# Patient Record
Sex: FEMALE | Race: WHITE | Employment: OTHER | URBAN - METROPOLITAN AREA
[De-identification: names, ages, dates, MRNs, and addresses within clinical notes are randomized per-mention and may not be internally consistent; named-entity substitution may affect disease eponyms.]

---

## 2017-07-03 ENCOUNTER — OFFICE VISIT (OUTPATIENT)
Dept: FAMILY MEDICINE CLINIC | Age: 62
End: 2017-07-03
Payer: COMMERCIAL

## 2017-07-03 ENCOUNTER — TELEPHONE (OUTPATIENT)
Dept: FAMILY MEDICINE CLINIC | Age: 62
End: 2017-07-03

## 2017-07-03 ENCOUNTER — HOSPITAL ENCOUNTER (OUTPATIENT)
Age: 62
Setting detail: SPECIMEN
Discharge: HOME | End: 2017-07-03
Payer: COMMERCIAL

## 2017-07-03 VITALS
DIASTOLIC BLOOD PRESSURE: 69 MMHG | SYSTOLIC BLOOD PRESSURE: 110 MMHG | WEIGHT: 183.8 LBS | TEMPERATURE: 97.8 F | BODY MASS INDEX: 36.08 KG/M2 | HEART RATE: 83 BPM | HEIGHT: 60 IN

## 2017-07-03 DIAGNOSIS — I10 ESSENTIAL HYPERTENSION: ICD-10-CM

## 2017-07-03 DIAGNOSIS — N95.1 MENOPAUSAL AND FEMALE CLIMACTERIC STATES: ICD-10-CM

## 2017-07-03 DIAGNOSIS — E78.00 PURE HYPERCHOLESTEROLEMIA: ICD-10-CM

## 2017-07-03 DIAGNOSIS — E78.00 PURE HYPERCHOLESTEROLEMIA: Primary | ICD-10-CM

## 2017-07-03 LAB
ALBUMIN SERPL-MCNC: 4.1 G/DL (ref 3.5–5.2)
ALBUMIN/GLOBULIN RATIO: 1.2 (ref 1–2.5)
ALP BLD-CCNC: 62 U/L (ref 35–104)
ALT SERPL-CCNC: 23 U/L (ref 5–33)
ANION GAP SERPL CALCULATED.3IONS-SCNC: 14 MMOL/L (ref 9–17)
AST SERPL-CCNC: 24 U/L
BILIRUB SERPL-MCNC: 0.39 MG/DL (ref 0.3–1.2)
BILIRUBIN DIRECT: 0.09 MG/DL
BILIRUBIN, INDIRECT: 0.3 MG/DL (ref 0–1)
BUN BLDV-MCNC: 12 MG/DL (ref 8–23)
BUN/CREAT BLD: NORMAL (ref 9–20)
CALCIUM SERPL-MCNC: 9.2 MG/DL (ref 8.6–10.4)
CHLORIDE BLD-SCNC: 99 MMOL/L (ref 98–107)
CHOLESTEROL/HDL RATIO: 4.8
CHOLESTEROL: 252 MG/DL
CO2: 27 MMOL/L (ref 20–31)
CREAT SERPL-MCNC: 0.63 MG/DL (ref 0.5–0.9)
GFR AFRICAN AMERICAN: >60 ML/MIN
GFR NON-AFRICAN AMERICAN: >60 ML/MIN
GFR SERPL CREATININE-BSD FRML MDRD: NORMAL ML/MIN/{1.73_M2}
GFR SERPL CREATININE-BSD FRML MDRD: NORMAL ML/MIN/{1.73_M2}
GLOBULIN: NORMAL G/DL (ref 1.5–3.8)
GLUCOSE BLD-MCNC: 92 MG/DL (ref 70–99)
HDLC SERPL-MCNC: 53 MG/DL
LDL CHOLESTEROL: 159 MG/DL (ref 0–130)
POTASSIUM SERPL-SCNC: 3.9 MMOL/L (ref 3.7–5.3)
SODIUM BLD-SCNC: 140 MMOL/L (ref 135–144)
TOTAL PROTEIN: 7.4 G/DL (ref 6.4–8.3)
TRIGL SERPL-MCNC: 198 MG/DL
VLDLC SERPL CALC-MCNC: ABNORMAL MG/DL (ref 1–30)

## 2017-07-03 PROCEDURE — 99213 OFFICE O/P EST LOW 20 MIN: CPT | Performed by: FAMILY MEDICINE

## 2017-07-03 RX ORDER — SIMVASTATIN 40 MG
40 TABLET ORAL NIGHTLY
Qty: 90 TABLET | Refills: 3 | Status: SHIPPED | OUTPATIENT
Start: 2017-07-03 | End: 2018-10-08 | Stop reason: SINTOL

## 2017-07-03 RX ORDER — CITALOPRAM 20 MG/1
20 TABLET ORAL DAILY
Qty: 90 TABLET | Refills: 3 | Status: SHIPPED | OUTPATIENT
Start: 2017-07-03 | End: 2018-01-16 | Stop reason: SDUPTHER

## 2017-07-03 RX ORDER — ESTRADIOL 0.1 MG/D
1 FILM, EXTENDED RELEASE TRANSDERMAL
Qty: 8 PATCH | Refills: 11 | Status: SHIPPED | OUTPATIENT
Start: 2017-07-03 | End: 2017-08-23 | Stop reason: SDUPTHER

## 2017-07-03 RX ORDER — SIMVASTATIN 40 MG
40 TABLET ORAL NIGHTLY
Qty: 90 TABLET | Refills: 3 | Status: SHIPPED | OUTPATIENT
Start: 2017-07-03 | End: 2017-07-03 | Stop reason: SDUPTHER

## 2017-07-03 RX ORDER — SPIRONOLACTONE AND HYDROCHLOROTHIAZIDE 25; 25 MG/1; MG/1
1 TABLET ORAL DAILY
Qty: 90 TABLET | Refills: 3 | Status: SHIPPED | OUTPATIENT
Start: 2017-07-03 | End: 2018-01-16 | Stop reason: SDUPTHER

## 2017-07-03 ASSESSMENT — ENCOUNTER SYMPTOMS
ABDOMINAL PAIN: 0
COUGH: 0
SHORTNESS OF BREATH: 0
SORE THROAT: 0
TROUBLE SWALLOWING: 0
CONSTIPATION: 0
NAUSEA: 0

## 2017-08-25 RX ORDER — ESTRADIOL 0.1 MG/D
1 FILM, EXTENDED RELEASE TRANSDERMAL
Qty: 8 PATCH | Refills: 11 | Status: SHIPPED | OUTPATIENT
Start: 2017-08-28 | End: 2017-08-30 | Stop reason: SDUPTHER

## 2017-08-30 DIAGNOSIS — N95.1 MENOPAUSAL HOT FLUSHES: ICD-10-CM

## 2017-08-30 DIAGNOSIS — K21.9 GASTROESOPHAGEAL REFLUX DISEASE WITHOUT ESOPHAGITIS: Primary | ICD-10-CM

## 2017-08-30 RX ORDER — ESTRADIOL 0.1 MG/D
1 FILM, EXTENDED RELEASE TRANSDERMAL
Qty: 8 PATCH | Refills: 11 | Status: SHIPPED | OUTPATIENT
Start: 2017-08-31 | End: 2018-10-08 | Stop reason: SDUPTHER

## 2018-01-16 DIAGNOSIS — F41.9 ANXIETY: Primary | ICD-10-CM

## 2018-01-16 DIAGNOSIS — I10 ESSENTIAL HYPERTENSION: ICD-10-CM

## 2018-01-16 RX ORDER — SPIRONOLACTONE AND HYDROCHLOROTHIAZIDE 25; 25 MG/1; MG/1
1 TABLET ORAL DAILY
Qty: 90 TABLET | Refills: 1 | Status: SHIPPED | OUTPATIENT
Start: 2018-01-16 | End: 2018-10-08 | Stop reason: SDUPTHER

## 2018-01-16 RX ORDER — CITALOPRAM 20 MG/1
20 TABLET ORAL DAILY
Qty: 90 TABLET | Refills: 1 | Status: SHIPPED | OUTPATIENT
Start: 2018-01-16 | End: 2018-10-08 | Stop reason: SDUPTHER

## 2018-05-09 DIAGNOSIS — K21.9 GASTROESOPHAGEAL REFLUX DISEASE WITHOUT ESOPHAGITIS: ICD-10-CM

## 2018-06-26 ENCOUNTER — HOSPITAL ENCOUNTER (EMERGENCY)
Age: 63
Discharge: HOME | End: 2018-06-26
Attending: EMERGENCY MEDICINE
Payer: COMMERCIAL

## 2018-06-26 ENCOUNTER — APPOINTMENT (OUTPATIENT)
Dept: CT IMAGING | Age: 63
End: 2018-06-26
Payer: COMMERCIAL

## 2018-06-26 VITALS
SYSTOLIC BLOOD PRESSURE: 146 MMHG | DIASTOLIC BLOOD PRESSURE: 92 MMHG | WEIGHT: 178 LBS | RESPIRATION RATE: 18 BRPM | TEMPERATURE: 98 F | HEART RATE: 94 BPM | OXYGEN SATURATION: 97 % | HEIGHT: 61 IN | BODY MASS INDEX: 33.61 KG/M2

## 2018-06-26 DIAGNOSIS — K57.32 DIVERTICULITIS OF COLON: Primary | ICD-10-CM

## 2018-06-26 LAB
-: ABNORMAL
ABSOLUTE EOS #: 0.1 K/UL (ref 0–0.4)
ABSOLUTE IMMATURE GRANULOCYTE: ABNORMAL K/UL (ref 0–0.3)
ABSOLUTE LYMPH #: 1.4 K/UL (ref 1–4.8)
ABSOLUTE MONO #: 0.9 K/UL (ref 0.1–1.3)
ALBUMIN SERPL-MCNC: 3.8 G/DL (ref 3.5–5.2)
ALBUMIN/GLOBULIN RATIO: ABNORMAL (ref 1–2.5)
ALP BLD-CCNC: 74 U/L (ref 35–104)
ALT SERPL-CCNC: 17 U/L (ref 5–33)
AMORPHOUS: ABNORMAL
ANION GAP SERPL CALCULATED.3IONS-SCNC: 11 MMOL/L (ref 9–17)
AST SERPL-CCNC: 24 U/L
BACTERIA: ABNORMAL
BASOPHILS # BLD: 1 % (ref 0–2)
BASOPHILS ABSOLUTE: 0.1 K/UL (ref 0–0.2)
BILIRUB SERPL-MCNC: 0.43 MG/DL (ref 0.3–1.2)
BILIRUBIN URINE: NEGATIVE
BUN BLDV-MCNC: 11 MG/DL (ref 8–23)
BUN/CREAT BLD: ABNORMAL (ref 9–20)
CALCIUM SERPL-MCNC: 9.2 MG/DL (ref 8.6–10.4)
CASTS UA: ABNORMAL /LPF
CHLORIDE BLD-SCNC: 97 MMOL/L (ref 98–107)
CO2: 30 MMOL/L (ref 20–31)
COLOR: YELLOW
COMMENT UA: ABNORMAL
CREAT SERPL-MCNC: 0.67 MG/DL (ref 0.5–0.9)
CRYSTALS, UA: ABNORMAL /HPF
DIFFERENTIAL TYPE: ABNORMAL
EOSINOPHILS RELATIVE PERCENT: 1 % (ref 0–4)
EPITHELIAL CELLS UA: ABNORMAL /HPF
GFR AFRICAN AMERICAN: >60 ML/MIN
GFR NON-AFRICAN AMERICAN: >60 ML/MIN
GFR SERPL CREATININE-BSD FRML MDRD: ABNORMAL ML/MIN/{1.73_M2}
GFR SERPL CREATININE-BSD FRML MDRD: ABNORMAL ML/MIN/{1.73_M2}
GLUCOSE BLD-MCNC: 126 MG/DL (ref 70–99)
GLUCOSE URINE: NEGATIVE
HCT VFR BLD CALC: 42.3 % (ref 36–46)
HEMOGLOBIN: 14.4 G/DL (ref 12–16)
IMMATURE GRANULOCYTES: ABNORMAL %
KETONES, URINE: NEGATIVE
LEUKOCYTE ESTERASE, URINE: NEGATIVE
LYMPHOCYTES # BLD: 11 % (ref 24–44)
MCH RBC QN AUTO: 29.4 PG (ref 26–34)
MCHC RBC AUTO-ENTMCNC: 34.1 G/DL (ref 31–37)
MCV RBC AUTO: 86.3 FL (ref 80–100)
MONOCYTES # BLD: 8 % (ref 1–7)
MUCUS: ABNORMAL
NITRITE, URINE: NEGATIVE
NRBC AUTOMATED: ABNORMAL PER 100 WBC
OTHER OBSERVATIONS UA: ABNORMAL
PDW BLD-RTO: 13 % (ref 11.5–14.9)
PH UA: 5.5 (ref 5–8)
PLATELET # BLD: 274 K/UL (ref 150–450)
PLATELET ESTIMATE: ABNORMAL
PMV BLD AUTO: 9.2 FL (ref 6–12)
POTASSIUM SERPL-SCNC: 3.4 MMOL/L (ref 3.7–5.3)
PROTEIN UA: NEGATIVE
RBC # BLD: 4.9 M/UL (ref 4–5.2)
RBC # BLD: ABNORMAL 10*6/UL
RBC UA: ABNORMAL /HPF
RENAL EPITHELIAL, UA: ABNORMAL /HPF
SEG NEUTROPHILS: 79 % (ref 36–66)
SEGMENTED NEUTROPHILS ABSOLUTE COUNT: 9.8 K/UL (ref 1.3–9.1)
SODIUM BLD-SCNC: 138 MMOL/L (ref 135–144)
SPECIFIC GRAVITY UA: 1.02 (ref 1–1.03)
TOTAL PROTEIN: 7.4 G/DL (ref 6.4–8.3)
TRICHOMONAS: ABNORMAL
TURBIDITY: ABNORMAL
URINE HGB: NEGATIVE
UROBILINOGEN, URINE: NORMAL
WBC # BLD: 12.2 K/UL (ref 3.5–11)
WBC # BLD: ABNORMAL 10*3/UL
WBC UA: ABNORMAL /HPF
YEAST: ABNORMAL

## 2018-06-26 PROCEDURE — 74176 CT ABD & PELVIS W/O CONTRAST: CPT

## 2018-06-26 PROCEDURE — 99284 EMERGENCY DEPT VISIT MOD MDM: CPT

## 2018-06-26 PROCEDURE — 81001 URINALYSIS AUTO W/SCOPE: CPT

## 2018-06-26 PROCEDURE — 6370000000 HC RX 637 (ALT 250 FOR IP): Performed by: EMERGENCY MEDICINE

## 2018-06-26 PROCEDURE — 85025 COMPLETE CBC W/AUTO DIFF WBC: CPT

## 2018-06-26 PROCEDURE — 80053 COMPREHEN METABOLIC PANEL: CPT

## 2018-06-26 PROCEDURE — 36415 COLL VENOUS BLD VENIPUNCTURE: CPT

## 2018-06-26 RX ORDER — METRONIDAZOLE 500 MG/1
500 TABLET ORAL 3 TIMES DAILY
Qty: 21 TABLET | Refills: 0 | Status: SHIPPED | OUTPATIENT
Start: 2018-06-26 | End: 2018-07-03

## 2018-06-26 RX ORDER — CIPROFLOXACIN 500 MG/1
500 TABLET, FILM COATED ORAL ONCE
Status: COMPLETED | OUTPATIENT
Start: 2018-06-26 | End: 2018-06-26

## 2018-06-26 RX ORDER — M-VIT,TX,IRON,MINS/CALC/FOLIC 27MG-0.4MG
1 TABLET ORAL DAILY
COMMUNITY
End: 2018-10-08 | Stop reason: ALTCHOICE

## 2018-06-26 RX ORDER — METRONIDAZOLE 500 MG/1
500 TABLET ORAL ONCE
Status: COMPLETED | OUTPATIENT
Start: 2018-06-26 | End: 2018-06-26

## 2018-06-26 RX ORDER — CIPROFLOXACIN 500 MG/1
500 TABLET, FILM COATED ORAL 2 TIMES DAILY
Qty: 14 TABLET | Refills: 0 | Status: SHIPPED | OUTPATIENT
Start: 2018-06-26 | End: 2018-07-03

## 2018-06-26 RX ADMIN — CIPROFLOXACIN 500 MG: 500 TABLET, FILM COATED ORAL at 09:03

## 2018-06-26 RX ADMIN — METRONIDAZOLE 500 MG: 500 TABLET ORAL at 09:03

## 2018-06-26 ASSESSMENT — ENCOUNTER SYMPTOMS
EYE REDNESS: 0
BACK PAIN: 0
COUGH: 0
NAUSEA: 0
RHINORRHEA: 0
ABDOMINAL PAIN: 1
EYE PAIN: 0
BLOOD IN STOOL: 1
VOMITING: 0
SHORTNESS OF BREATH: 0

## 2018-10-08 ENCOUNTER — OFFICE VISIT (OUTPATIENT)
Dept: FAMILY MEDICINE CLINIC | Age: 63
End: 2018-10-08
Payer: COMMERCIAL

## 2018-10-08 ENCOUNTER — TELEPHONE (OUTPATIENT)
Dept: FAMILY MEDICINE CLINIC | Age: 63
End: 2018-10-08

## 2018-10-08 VITALS
BODY MASS INDEX: 34.63 KG/M2 | TEMPERATURE: 97 F | HEART RATE: 80 BPM | DIASTOLIC BLOOD PRESSURE: 79 MMHG | WEIGHT: 183.4 LBS | HEIGHT: 61 IN | SYSTOLIC BLOOD PRESSURE: 127 MMHG

## 2018-10-08 DIAGNOSIS — J30.89 ALLERGIC RHINITIS DUE TO OTHER ALLERGIC TRIGGER, UNSPECIFIED SEASONALITY: ICD-10-CM

## 2018-10-08 DIAGNOSIS — F41.9 ANXIETY: ICD-10-CM

## 2018-10-08 DIAGNOSIS — Z12.39 BREAST SCREENING: ICD-10-CM

## 2018-10-08 DIAGNOSIS — K21.9 GASTROESOPHAGEAL REFLUX DISEASE WITHOUT ESOPHAGITIS: ICD-10-CM

## 2018-10-08 DIAGNOSIS — K57.30 DIVERTICULOSIS OF LARGE INTESTINE WITHOUT HEMORRHAGE: ICD-10-CM

## 2018-10-08 DIAGNOSIS — N95.1 MENOPAUSAL HOT FLUSHES: ICD-10-CM

## 2018-10-08 DIAGNOSIS — I10 ESSENTIAL HYPERTENSION: Primary | ICD-10-CM

## 2018-10-08 DIAGNOSIS — R42 VERTIGO: ICD-10-CM

## 2018-10-08 PROCEDURE — G0008 ADMIN INFLUENZA VIRUS VAC: HCPCS | Performed by: FAMILY MEDICINE

## 2018-10-08 PROCEDURE — 99213 OFFICE O/P EST LOW 20 MIN: CPT | Performed by: FAMILY MEDICINE

## 2018-10-08 RX ORDER — SCOLOPAMINE TRANSDERMAL SYSTEM 1 MG/1
1 PATCH, EXTENDED RELEASE TRANSDERMAL
Qty: 4 PATCH | Refills: 2 | Status: SHIPPED | OUTPATIENT
Start: 2018-10-08 | End: 2019-05-14 | Stop reason: SDUPTHER

## 2018-10-08 RX ORDER — METRONIDAZOLE 250 MG/1
250 TABLET ORAL 3 TIMES DAILY
Qty: 30 TABLET | Refills: 1 | Status: SHIPPED | OUTPATIENT
Start: 2018-10-08 | End: 2019-12-13 | Stop reason: SDUPTHER

## 2018-10-08 RX ORDER — ESTRADIOL 0.1 MG/D
1 FILM, EXTENDED RELEASE TRANSDERMAL
Qty: 8 PATCH | Refills: 11 | Status: SHIPPED | OUTPATIENT
Start: 2018-10-08 | End: 2019-05-14 | Stop reason: SDUPTHER

## 2018-10-08 RX ORDER — CITALOPRAM 20 MG/1
20 TABLET ORAL DAILY
Qty: 90 TABLET | Refills: 1 | Status: SHIPPED | OUTPATIENT
Start: 2018-10-08 | End: 2019-05-11 | Stop reason: SDUPTHER

## 2018-10-08 RX ORDER — SPIRONOLACTONE AND HYDROCHLOROTHIAZIDE 25; 25 MG/1; MG/1
1 TABLET ORAL DAILY
Qty: 90 TABLET | Refills: 3 | Status: SHIPPED | OUTPATIENT
Start: 2018-10-08 | End: 2019-05-14 | Stop reason: SDUPTHER

## 2018-10-08 RX ORDER — MOMETASONE FUROATE 50 UG/1
2 SPRAY, METERED NASAL DAILY PRN
Qty: 1 INHALER | Refills: 6 | Status: SHIPPED | OUTPATIENT
Start: 2018-10-08 | End: 2019-05-14 | Stop reason: SDUPTHER

## 2018-10-08 ASSESSMENT — ENCOUNTER SYMPTOMS
COUGH: 0
ABDOMINAL PAIN: 0
SHORTNESS OF BREATH: 0
CONSTIPATION: 0
BACK PAIN: 0
SORE THROAT: 0
PHOTOPHOBIA: 0

## 2018-10-08 ASSESSMENT — PATIENT HEALTH QUESTIONNAIRE - PHQ9
SUM OF ALL RESPONSES TO PHQ QUESTIONS 1-9: 0
SUM OF ALL RESPONSES TO PHQ QUESTIONS 1-9: 0
SUM OF ALL RESPONSES TO PHQ9 QUESTIONS 1 & 2: 0
2. FEELING DOWN, DEPRESSED OR HOPELESS: 0
1. LITTLE INTEREST OR PLEASURE IN DOING THINGS: 0

## 2018-10-08 NOTE — PROGRESS NOTES
without hemorrhage  metroNIDAZOLE (FLAGYL) 250 MG tablet   8. Breast screening  [facility] DIGITAL SCREEN SELF REFERRAL W OR WO CAD BILATERAL           Plan:      See orders.       Recheck office visit - 1 year           Thomas Pretty DO

## 2018-10-08 NOTE — PATIENT INSTRUCTIONS
2. [address] [place], [place] [address]  [phone]  Hours: Monday-Friday 8:00 am-8:00 pm and Saturday and Sunday 9:30 am-6:00 pm    3. [address]  [address] [place], [place] [address]  [phone]  Hours: Monday-Friday 9:00 am-5:30 pm and Saturday 10:00 am-4:00 pm    4. [address] [place] [address]  [phone]  Hours: Monday-Friday 9:00 am-5:30 pm and Saturday 10:00 am-2:00 pm

## 2018-10-09 PROCEDURE — G0008 ADMIN INFLUENZA VIRUS VAC: HCPCS | Performed by: FAMILY MEDICINE

## 2018-10-18 ENCOUNTER — TELEPHONE (OUTPATIENT)
Dept: FAMILY MEDICINE CLINIC | Age: 63
End: 2018-10-18

## 2018-10-18 DIAGNOSIS — Z80.0 FAMILY HISTORY OF COLON CANCER: Primary | ICD-10-CM

## 2018-10-18 DIAGNOSIS — Z80.0 FAMILY HISTORY OF COLON CANCER: ICD-10-CM

## 2018-10-18 LAB
CONTROL: PRESENT
HEMOCCULT STL QL: NEGATIVE

## 2019-01-18 ENCOUNTER — TELEPHONE (OUTPATIENT)
Dept: FAMILY MEDICINE CLINIC | Age: 64
End: 2019-01-18

## 2019-01-23 ENCOUNTER — HOSPITAL ENCOUNTER (OUTPATIENT)
Dept: WOMENS IMAGING | Age: 64
Discharge: HOME | End: 2019-01-25
Payer: COMMERCIAL

## 2019-01-23 DIAGNOSIS — Z12.39 BREAST SCREENING: ICD-10-CM

## 2019-01-23 PROCEDURE — 77063 BREAST TOMOSYNTHESIS BI: CPT

## 2019-05-11 DIAGNOSIS — F41.9 ANXIETY: ICD-10-CM

## 2019-05-13 RX ORDER — CITALOPRAM 20 MG/1
TABLET ORAL
Qty: 150 TABLET | Refills: 0 | Status: SHIPPED | OUTPATIENT
Start: 2019-05-13 | End: 2019-12-13 | Stop reason: SDUPTHER

## 2019-05-13 NOTE — TELEPHONE ENCOUNTER
Next Visit Date:  No future appointments.     Health Maintenance   Topic Date Due    Hepatitis C screen  1955    HIV screen  03/07/1969    DTaP/Tdap/Td vaccine (1 - Tdap) 03/07/1973    Diabetes screen  03/07/1994    Shingles Vaccine (1 of 2) 03/07/2004    Pneumococcal 65+ years Vaccine (1 of 2 - PCV13) 03/07/2019    Potassium monitoring  06/26/2019    Creatinine monitoring  06/26/2019    Colon Cancer Screen FIT/FOBT  10/17/2019    Breast cancer screen  01/23/2020    Cervical cancer screen  07/25/2021    Lipid screen  07/03/2022    DEXA (modify frequency per FRAX score)  Completed    Flu vaccine  Completed       No results found for: LABA1C          ( goal A1C is < 7)   No results found for: LABMICR  LDL Cholesterol (mg/dL)   Date Value   07/03/2017 159 (H)       (goal LDL is <100)   AST (U/L)   Date Value   06/26/2018 24     ALT (U/L)   Date Value   06/26/2018 17     BUN (mg/dL)   Date Value   06/26/2018 11     BP Readings from Last 3 Encounters:   10/08/18 127/79   06/26/18 (!) 146/92   07/03/17 110/69          (goal 120/80)    All Future Testing planned in [facility]  Lab Frequency Next Occurrence               Patient Active Problem List:     Hyperlipidemia     GERD (gastroesophageal reflux disease)     Allergic rhinitis     Family history of breast cancer     Family history of colon cancer

## 2019-05-14 DIAGNOSIS — N95.1 MENOPAUSAL HOT FLUSHES: ICD-10-CM

## 2019-05-14 DIAGNOSIS — R42 VERTIGO: ICD-10-CM

## 2019-05-14 DIAGNOSIS — K21.9 GASTROESOPHAGEAL REFLUX DISEASE WITHOUT ESOPHAGITIS: ICD-10-CM

## 2019-05-14 DIAGNOSIS — I10 ESSENTIAL HYPERTENSION: ICD-10-CM

## 2019-05-14 DIAGNOSIS — J30.89 ALLERGIC RHINITIS DUE TO OTHER ALLERGIC TRIGGER, UNSPECIFIED SEASONALITY: ICD-10-CM

## 2019-05-14 NOTE — TELEPHONE ENCOUNTER
Patient calling and has new insurance, needs refills on all meds sent to [facility] [employer] Last visit:   Last Med refill:   Does patient have enough medication for 72 hours: Yes    Next Visit Date:  No future appointments.     Health Maintenance   Topic Date Due    Hepatitis C screen  1955    HIV screen  03/07/1969    DTaP/Tdap/Td vaccine (1 - Tdap) 03/07/1973    Diabetes screen  03/07/1994    Shingles Vaccine (1 of 2) 03/07/2004    Pneumococcal 65+ years Vaccine (1 of 2 - PCV13) 03/07/2019    Potassium monitoring  06/26/2019    Creatinine monitoring  06/26/2019    Colon Cancer Screen FIT/FOBT  10/17/2019    Breast cancer screen  01/23/2020    Cervical cancer screen  07/25/2021    Lipid screen  07/03/2022    DEXA (modify frequency per FRAX score)  Completed    Flu vaccine  Completed       No results found for: LABA1C          ( goal A1C is < 7)   No results found for: LABMICR  LDL Cholesterol (mg/dL)   Date Value   07/03/2017 159 (H)       (goal LDL is <100)   AST (U/L)   Date Value   06/26/2018 24     ALT (U/L)   Date Value   06/26/2018 17     BUN (mg/dL)   Date Value   06/26/2018 11     BP Readings from Last 3 Encounters:   10/08/18 127/79   06/26/18 (!) 146/92   07/03/17 110/69          (goal 120/80)    All Future Testing planned in [facility]  Lab Frequency Next Occurrence               Patient Active Problem List:     Hyperlipidemia     GERD (gastroesophageal reflux disease)     Allergic rhinitis     Family history of breast cancer     Family history of colon cancer

## 2019-05-16 RX ORDER — SPIRONOLACTONE AND HYDROCHLOROTHIAZIDE 25; 25 MG/1; MG/1
1 TABLET ORAL DAILY
Qty: 90 TABLET | Refills: 3 | Status: SHIPPED | OUTPATIENT
Start: 2019-05-16 | End: 2019-10-23 | Stop reason: SDUPTHER

## 2019-05-16 RX ORDER — ESTRADIOL 0.1 MG/D
1 FILM, EXTENDED RELEASE TRANSDERMAL
Qty: 8 PATCH | Refills: 11 | Status: SHIPPED | OUTPATIENT
Start: 2019-05-16 | End: 2019-12-13 | Stop reason: SDUPTHER

## 2019-05-16 RX ORDER — SCOLOPAMINE TRANSDERMAL SYSTEM 1 MG/1
1 PATCH, EXTENDED RELEASE TRANSDERMAL
Qty: 4 PATCH | Refills: 2 | Status: SHIPPED | OUTPATIENT
Start: 2019-05-16 | End: 2019-12-20

## 2019-05-16 RX ORDER — MOMETASONE FUROATE 50 UG/1
2 SPRAY, METERED NASAL DAILY PRN
Qty: 1 INHALER | Refills: 6 | Status: SHIPPED | OUTPATIENT
Start: 2019-05-16

## 2019-10-23 ENCOUNTER — TELEPHONE (OUTPATIENT)
Dept: FAMILY MEDICINE CLINIC | Age: 64
End: 2019-10-23

## 2019-10-23 DIAGNOSIS — I10 ESSENTIAL HYPERTENSION: ICD-10-CM

## 2019-10-23 RX ORDER — SPIRONOLACTONE AND HYDROCHLOROTHIAZIDE 25; 25 MG/1; MG/1
1 TABLET ORAL DAILY
Qty: 90 TABLET | Refills: 0 | Status: SHIPPED | OUTPATIENT
Start: 2019-10-23 | End: 2019-12-13 | Stop reason: SDUPTHER

## 2019-12-13 ENCOUNTER — OFFICE VISIT (OUTPATIENT)
Dept: FAMILY MEDICINE CLINIC | Age: 64
End: 2019-12-13
Payer: MEDICARE

## 2019-12-13 VITALS
HEIGHT: 61 IN | TEMPERATURE: 98.3 F | DIASTOLIC BLOOD PRESSURE: 73 MMHG | SYSTOLIC BLOOD PRESSURE: 123 MMHG | HEART RATE: 75 BPM | WEIGHT: 183.8 LBS | BODY MASS INDEX: 34.7 KG/M2

## 2019-12-13 DIAGNOSIS — K57.30 DIVERTICULOSIS OF LARGE INTESTINE WITHOUT HEMORRHAGE: ICD-10-CM

## 2019-12-13 DIAGNOSIS — Z12.11 COLON CANCER SCREENING: Primary | ICD-10-CM

## 2019-12-13 DIAGNOSIS — Z00.00 ROUTINE GENERAL MEDICAL EXAMINATION AT A HEALTH CARE FACILITY: ICD-10-CM

## 2019-12-13 DIAGNOSIS — Z12.39 SCREENING FOR BREAST CANCER: ICD-10-CM

## 2019-12-13 DIAGNOSIS — I10 ESSENTIAL HYPERTENSION: ICD-10-CM

## 2019-12-13 DIAGNOSIS — F41.9 ANXIETY: ICD-10-CM

## 2019-12-13 DIAGNOSIS — Z13.1 SCREENING FOR DIABETES MELLITUS (DM): ICD-10-CM

## 2019-12-13 DIAGNOSIS — Z13.1 ENCOUNTER FOR SCREENING FOR DIABETES MELLITUS: ICD-10-CM

## 2019-12-13 DIAGNOSIS — N95.1 MENOPAUSAL HOT FLUSHES: ICD-10-CM

## 2019-12-13 DIAGNOSIS — B00.9 HSV INFECTION: ICD-10-CM

## 2019-12-13 PROCEDURE — G0402 INITIAL PREVENTIVE EXAM: HCPCS | Performed by: FAMILY MEDICINE

## 2019-12-13 PROCEDURE — 99211 OFF/OP EST MAY X REQ PHY/QHP: CPT | Performed by: FAMILY MEDICINE

## 2019-12-13 RX ORDER — CITALOPRAM 20 MG/1
20 TABLET ORAL EVERY MORNING
Qty: 90 TABLET | Refills: 3 | Status: SHIPPED | OUTPATIENT
Start: 2019-12-13 | End: 2020-10-21

## 2019-12-13 RX ORDER — METRONIDAZOLE 250 MG/1
250 TABLET ORAL 3 TIMES DAILY
Qty: 90 TABLET | Refills: 1 | Status: SHIPPED | OUTPATIENT
Start: 2019-12-13 | End: 2020-02-11

## 2019-12-13 RX ORDER — ESTRADIOL 0.1 MG/D
1 FILM, EXTENDED RELEASE TRANSDERMAL
Qty: 24 PATCH | Refills: 3 | Status: SHIPPED | OUTPATIENT
Start: 2019-12-16 | End: 2020-04-13

## 2019-12-13 RX ORDER — SPIRONOLACTONE AND HYDROCHLOROTHIAZIDE 25; 25 MG/1; MG/1
1 TABLET ORAL DAILY
Qty: 90 TABLET | Refills: 3 | Status: SHIPPED | OUTPATIENT
Start: 2019-12-13 | End: 2020-10-21

## 2019-12-13 RX ORDER — VALACYCLOVIR HYDROCHLORIDE 500 MG/1
500 TABLET, FILM COATED ORAL 3 TIMES DAILY
Qty: 90 TABLET | Refills: 1 | Status: SHIPPED | OUTPATIENT
Start: 2019-12-13 | End: 2020-02-11

## 2019-12-13 ASSESSMENT — PATIENT HEALTH QUESTIONNAIRE - PHQ9
SUM OF ALL RESPONSES TO PHQ QUESTIONS 1-9: 0
SUM OF ALL RESPONSES TO PHQ QUESTIONS 1-9: 0

## 2019-12-13 ASSESSMENT — LIFESTYLE VARIABLES: HOW OFTEN DO YOU HAVE A DRINK CONTAINING ALCOHOL: 0

## 2019-12-19 DIAGNOSIS — R42 VERTIGO: ICD-10-CM

## 2019-12-20 RX ORDER — SCOLOPAMINE TRANSDERMAL SYSTEM 1 MG/1
PATCH, EXTENDED RELEASE TRANSDERMAL
Qty: 4 PATCH | Refills: 0 | Status: SHIPPED | OUTPATIENT
Start: 2019-12-20 | End: 2021-12-13

## 2020-04-13 ENCOUNTER — TELEPHONE (OUTPATIENT)
Dept: FAMILY MEDICINE CLINIC | Age: 65
End: 2020-04-13

## 2020-04-13 RX ORDER — ESTRADIOL 0.1 MG/D
1 FILM, EXTENDED RELEASE TRANSDERMAL
Qty: 8 PATCH | Refills: 3 | Status: SHIPPED | OUTPATIENT
Start: 2020-04-13 | End: 2020-04-16

## 2020-04-16 ENCOUNTER — TELEPHONE (OUTPATIENT)
Dept: FAMILY MEDICINE CLINIC | Age: 65
End: 2020-04-16

## 2020-04-16 RX ORDER — ESTRADIOL 0.1 MG/D
1 FILM, EXTENDED RELEASE TRANSDERMAL SEE ADMIN INSTRUCTIONS
Qty: 8 PATCH | Refills: 11 | Status: SHIPPED | OUTPATIENT
Start: 2020-04-16 | End: 2021-06-01

## 2020-04-16 NOTE — TELEPHONE ENCOUNTER
Adrian Barry sent a message - cost of the brand of estradiol patches high - would like the following based upon input from pharmacy that she can use Good Rx coupon:      RX [id number]    NDC 5508-8801-67    Estradiol 0.1 MG/D BIWK PAT BREWSTER  QTY 8  Apply 1 PATCH TO SKIN TWO TIMES A WEEK  RF 11 TIMES

## 2020-04-17 ENCOUNTER — TELEPHONE (OUTPATIENT)
Dept: FAMILY MEDICINE CLINIC | Age: 65
End: 2020-04-17

## 2020-06-12 ENCOUNTER — TELEPHONE (OUTPATIENT)
Dept: FAMILY MEDICINE CLINIC | Age: 65
End: 2020-06-12

## 2020-06-17 ENCOUNTER — VIRTUAL VISIT (OUTPATIENT)
Dept: SURGERY | Age: 65
End: 2020-06-17
Payer: MEDICARE

## 2020-06-17 PROCEDURE — 99202 OFFICE O/P NEW SF 15 MIN: CPT | Performed by: STUDENT IN AN ORGANIZED HEALTH CARE EDUCATION/TRAINING PROGRAM

## 2020-06-17 PROCEDURE — 3017F COLORECTAL CA SCREEN DOC REV: CPT | Performed by: STUDENT IN AN ORGANIZED HEALTH CARE EDUCATION/TRAINING PROGRAM

## 2020-06-17 PROCEDURE — G8427 DOCREV CUR MEDS BY ELIG CLIN: HCPCS | Performed by: STUDENT IN AN ORGANIZED HEALTH CARE EDUCATION/TRAINING PROGRAM

## 2020-06-17 PROCEDURE — 1090F PRES/ABSN URINE INCON ASSESS: CPT | Performed by: STUDENT IN AN ORGANIZED HEALTH CARE EDUCATION/TRAINING PROGRAM

## 2020-06-17 PROCEDURE — G8399 PT W/DXA RESULTS DOCUMENT: HCPCS | Performed by: STUDENT IN AN ORGANIZED HEALTH CARE EDUCATION/TRAINING PROGRAM

## 2020-06-17 PROCEDURE — 1036F TOBACCO NON-USER: CPT | Performed by: STUDENT IN AN ORGANIZED HEALTH CARE EDUCATION/TRAINING PROGRAM

## 2020-06-17 PROCEDURE — G8417 CALC BMI ABV UP PARAM F/U: HCPCS | Performed by: STUDENT IN AN ORGANIZED HEALTH CARE EDUCATION/TRAINING PROGRAM

## 2020-06-17 PROCEDURE — 1123F ACP DISCUSS/DSCN MKR DOCD: CPT | Performed by: STUDENT IN AN ORGANIZED HEALTH CARE EDUCATION/TRAINING PROGRAM

## 2020-06-17 PROCEDURE — 4040F PNEUMOC VAC/ADMIN/RCVD: CPT | Performed by: STUDENT IN AN ORGANIZED HEALTH CARE EDUCATION/TRAINING PROGRAM

## 2020-06-17 RX ORDER — POLYETHYLENE GLYCOL 3350 17 G/17G
238 POWDER, FOR SOLUTION ORAL DAILY
Qty: 238 G | Refills: 1 | Status: SHIPPED | OUTPATIENT
Start: 2020-06-17 | End: 2020-07-17

## 2020-06-17 NOTE — PROGRESS NOTES
History and Physical  [facility]    Patient's Name/Date of Birth: Bekah Chong / 1955 (37 y.o.)    Date: June 17, 2020     HPI: Pt is a 76 y.o. female with + cologuard on screening. Patient states she has had a colonoscopy roughly 15 to 20 years ago which was normal.  She has a maternal aunt who was diagnosed with colon cancer in her 76s, and a maternal half brother who was diagnosed in early 62s with colon cancer. She denies blood in her stool. No changes in bowel habits. History of hysterectomy. Past Medical History:   Diagnosis Date    Allergic rhinitis     GERD (gastroesophageal reflux disease)     Hyperlipidemia        Past Surgical History:   Procedure Laterality Date    BLADDER REPAIR      BREAST SURGERY      lumpectomy    HYSTERECTOMY      TONSILLECTOMY         Current Outpatient Medications   Medication Sig Dispense Refill    estradiol (VIVELLE) 0.1 MG/24HR Place 1 patch onto the skin See Admin Instructions RX [id number] (2019) NDC 6451-5486-03 Estradiol 0.1 MG/D BIWK PAT BREWSTER QTY 8 Apply 1 PATCH TO SKIN TWO TIMES A WEEK RF 11 TIMES 8 patch 11    scopolamine (TRANSDERM-SCOP) transdermal patch APPLY 1 PATCH TOPICALLY EVERY 72 HOURS 4 patch 0    spironolactone-hydrochlorothiazide (ALDACTAZIDE) 25-25 MG per tablet Take 1 tablet by mouth daily 90 tablet 3    citalopram (CELEXA) 20 MG tablet Take 1 tablet by mouth every morning 90 tablet 3    esomeprazole (NEXIUM) 20 MG delayed release capsule TAKE 1 CAPSULE DAILY 90 capsule 3    mometasone (NASONEX) 50 MCG/ACT nasal spray 2 sprays by Nasal route daily as needed (nasal congestion) 1 Inhaler 6    UNABLE TO FIND Take 1 capsule by mouth nightly. Progesterone DHEA 7 keto DHEA 75-10 SR 90 mg 3    LYCOPENE 1 capsule by Does not apply route daily. No current facility-administered medications for this visit.         Allergies   Allergen Reactions    Tape Kenna Shearing Tape]      blisters       Family History   Problem Relation Age of Onset    Breast Cancer Mother     Obesity Mother     Obesity Father     Obesity Brother     Hypertension Maternal Aunt        Social History     Socioeconomic History    Marital status:      Spouse name: Not on file    Number of children: Not on file    Years of education: Not on file    Highest education level: Not on file   Occupational History    Not on file   Social Needs    Financial resource strain: Not on file    Food insecurity     Worry: Not on file     Inability: Not on file    Transportation needs     Medical: Not on file     Non-medical: Not on file   Tobacco Use    Smoking status: Former Smoker     Packs/day: 1.00     Years: 30.00     Pack years: 30.00     Types: Cigarettes     Last attempt to quit: 10/8/1988     Years since quittin.7    Smokeless tobacco: Never Used   Substance and Sexual Activity    Alcohol use: No    Drug use: No    Sexual activity: Yes     Partners: Male     Birth control/protection: Surgical     Comment: hyst   Lifestyle    Physical activity     Days per week: Not on file     Minutes per session: Not on file    Stress: Not on file   Relationships    Social connections     Talks on phone: Not on file     Gets together: Not on file     Attends Bahai service: Not on file     Active member of club or organization: Not on file     Attends meetings of clubs or organizations: Not on file     Relationship status: Not on file    Intimate partner violence     Fear of current or ex partner: Not on file     Emotionally abused: Not on file     Physically abused: Not on file     Forced sexual activity: Not on file   Other Topics Concern    Not on file   Social History Narrative    Not on file       ROS:   GEN: Denies recent weight loss, fatigue, fevers, chills. HEENT: No rhinorrhea, dysphagia, odynphagia. CV: No history of MI, recent chest pain. RESP: Denies shortness of breath, COPD, asthma.   GI: Denies abdominal pain, nausea,

## 2020-08-20 ENCOUNTER — HOSPITAL ENCOUNTER (OUTPATIENT)
Dept: PREADMISSION TESTING | Age: 65
Setting detail: SPECIMEN
Discharge: HOME | End: 2020-08-24
Payer: MEDICARE

## 2020-08-20 PROCEDURE — U0003 INFECTIOUS AGENT DETECTION BY NUCLEIC ACID (DNA OR RNA); SEVERE ACUTE RESPIRATORY SYNDROME CORONAVIRUS 2 (SARS-COV-2) (CORONAVIRUS DISEASE [COVID-19]), AMPLIFIED PROBE TECHNIQUE, MAKING USE OF HIGH THROUGHPUT TECHNOLOGIES AS DESCRIBED BY CMS-2020-01-R: HCPCS

## 2020-08-21 ENCOUNTER — ANESTHESIA EVENT (OUTPATIENT)
Dept: OPERATING ROOM | Age: 65
End: 2020-08-21
Payer: MEDICARE

## 2020-08-21 RX ORDER — LIDOCAINE HYDROCHLORIDE 10 MG/ML
1 INJECTION, SOLUTION EPIDURAL; INFILTRATION; INTRACAUDAL; PERINEURAL
Status: CANCELLED | OUTPATIENT
Start: 2020-08-21 | End: 2020-08-21

## 2020-08-21 RX ORDER — SODIUM CHLORIDE 0.9 % (FLUSH) 0.9 %
10 SYRINGE (ML) INJECTION PRN
Status: CANCELLED | OUTPATIENT
Start: 2020-08-21

## 2020-08-21 RX ORDER — SODIUM CHLORIDE 0.9 % (FLUSH) 0.9 %
10 SYRINGE (ML) INJECTION EVERY 12 HOURS SCHEDULED
Status: CANCELLED | OUTPATIENT
Start: 2020-08-21

## 2020-08-22 LAB — SARS-COV-2, NAA: NOT DETECTED

## 2020-08-24 ENCOUNTER — HOSPITAL ENCOUNTER (OUTPATIENT)
Age: 65
Setting detail: OUTPATIENT SURGERY
Discharge: HOME | End: 2020-08-24
Attending: SURGERY | Admitting: SURGERY
Payer: MEDICARE

## 2020-08-24 ENCOUNTER — ANESTHESIA (OUTPATIENT)
Dept: OPERATING ROOM | Age: 65
End: 2020-08-24
Payer: MEDICARE

## 2020-08-24 VITALS
DIASTOLIC BLOOD PRESSURE: 71 MMHG | BODY MASS INDEX: 35.69 KG/M2 | OXYGEN SATURATION: 95 % | HEART RATE: 76 BPM | HEIGHT: 60 IN | WEIGHT: 181.8 LBS | TEMPERATURE: 98.3 F | SYSTOLIC BLOOD PRESSURE: 86 MMHG | RESPIRATION RATE: 18 BRPM

## 2020-08-24 VITALS — DIASTOLIC BLOOD PRESSURE: 51 MMHG | OXYGEN SATURATION: 95 % | SYSTOLIC BLOOD PRESSURE: 103 MMHG

## 2020-08-24 PROCEDURE — 6360000002 HC RX W HCPCS: Performed by: NURSE ANESTHETIST, CERTIFIED REGISTERED

## 2020-08-24 PROCEDURE — 7100000010 HC PHASE II RECOVERY - FIRST 15 MIN: Performed by: SURGERY

## 2020-08-24 PROCEDURE — 3609027000 HC COLONOSCOPY: Performed by: SURGERY

## 2020-08-24 PROCEDURE — 2709999900 HC NON-CHARGEABLE SUPPLY: Performed by: SURGERY

## 2020-08-24 PROCEDURE — 3700000000 HC ANESTHESIA ATTENDED CARE: Performed by: SURGERY

## 2020-08-24 PROCEDURE — 7100000011 HC PHASE II RECOVERY - ADDTL 15 MIN: Performed by: SURGERY

## 2020-08-24 PROCEDURE — 2580000003 HC RX 258: Performed by: ANESTHESIOLOGY

## 2020-08-24 PROCEDURE — 2500000003 HC RX 250 WO HCPCS: Performed by: NURSE ANESTHETIST, CERTIFIED REGISTERED

## 2020-08-24 PROCEDURE — 3700000001 HC ADD 15 MINUTES (ANESTHESIA): Performed by: SURGERY

## 2020-08-24 RX ORDER — PROMETHAZINE HYDROCHLORIDE 25 MG/ML
12.5 INJECTION, SOLUTION INTRAMUSCULAR; INTRAVENOUS
Status: DISCONTINUED | OUTPATIENT
Start: 2020-08-24 | End: 2020-08-24 | Stop reason: HOSPADM

## 2020-08-24 RX ORDER — OXYCODONE HYDROCHLORIDE AND ACETAMINOPHEN 5; 325 MG/1; MG/1
2 TABLET ORAL PRN
Status: DISCONTINUED | OUTPATIENT
Start: 2020-08-24 | End: 2020-08-24 | Stop reason: HOSPADM

## 2020-08-24 RX ORDER — LABETALOL 20 MG/4 ML (5 MG/ML) INTRAVENOUS SYRINGE
5 EVERY 10 MIN PRN
Status: DISCONTINUED | OUTPATIENT
Start: 2020-08-24 | End: 2020-08-24 | Stop reason: HOSPADM

## 2020-08-24 RX ORDER — ONDANSETRON 2 MG/ML
4 INJECTION INTRAMUSCULAR; INTRAVENOUS
Status: DISCONTINUED | OUTPATIENT
Start: 2020-08-24 | End: 2020-08-24 | Stop reason: HOSPADM

## 2020-08-24 RX ORDER — SODIUM CHLORIDE, SODIUM LACTATE, POTASSIUM CHLORIDE, CALCIUM CHLORIDE 600; 310; 30; 20 MG/100ML; MG/100ML; MG/100ML; MG/100ML
INJECTION, SOLUTION INTRAVENOUS CONTINUOUS
Status: DISCONTINUED | OUTPATIENT
Start: 2020-08-24 | End: 2020-08-24 | Stop reason: HOSPADM

## 2020-08-24 RX ORDER — PROPOFOL 10 MG/ML
INJECTION, EMULSION INTRAVENOUS PRN
Status: DISCONTINUED | OUTPATIENT
Start: 2020-08-24 | End: 2020-08-24 | Stop reason: SDUPTHER

## 2020-08-24 RX ORDER — MORPHINE SULFATE 2 MG/ML
2 INJECTION, SOLUTION INTRAMUSCULAR; INTRAVENOUS EVERY 5 MIN PRN
Status: DISCONTINUED | OUTPATIENT
Start: 2020-08-24 | End: 2020-08-24 | Stop reason: HOSPADM

## 2020-08-24 RX ORDER — OXYCODONE HYDROCHLORIDE AND ACETAMINOPHEN 5; 325 MG/1; MG/1
1 TABLET ORAL PRN
Status: DISCONTINUED | OUTPATIENT
Start: 2020-08-24 | End: 2020-08-24 | Stop reason: HOSPADM

## 2020-08-24 RX ORDER — DIPHENHYDRAMINE HYDROCHLORIDE 50 MG/ML
12.5 INJECTION INTRAMUSCULAR; INTRAVENOUS
Status: DISCONTINUED | OUTPATIENT
Start: 2020-08-24 | End: 2020-08-24 | Stop reason: HOSPADM

## 2020-08-24 RX ORDER — 0.9 % SODIUM CHLORIDE 0.9 %
500 INTRAVENOUS SOLUTION INTRAVENOUS
Status: DISCONTINUED | OUTPATIENT
Start: 2020-08-24 | End: 2020-08-24 | Stop reason: HOSPADM

## 2020-08-24 RX ORDER — MEPERIDINE HYDROCHLORIDE 50 MG/ML
12.5 INJECTION INTRAMUSCULAR; INTRAVENOUS; SUBCUTANEOUS EVERY 5 MIN PRN
Status: DISCONTINUED | OUTPATIENT
Start: 2020-08-24 | End: 2020-08-24 | Stop reason: HOSPADM

## 2020-08-24 RX ORDER — LIDOCAINE HYDROCHLORIDE 10 MG/ML
INJECTION, SOLUTION INFILTRATION; PERINEURAL PRN
Status: DISCONTINUED | OUTPATIENT
Start: 2020-08-24 | End: 2020-08-24 | Stop reason: SDUPTHER

## 2020-08-24 RX ADMIN — PROPOFOL 20 MG: 10 INJECTION, EMULSION INTRAVENOUS at 11:06

## 2020-08-24 RX ADMIN — PROPOFOL 50 MG: 10 INJECTION, EMULSION INTRAVENOUS at 10:42

## 2020-08-24 RX ADMIN — SODIUM CHLORIDE, POTASSIUM CHLORIDE, SODIUM LACTATE AND CALCIUM CHLORIDE: 600; 310; 30; 20 INJECTION, SOLUTION INTRAVENOUS at 09:17

## 2020-08-24 RX ADMIN — PROPOFOL 30 MG: 10 INJECTION, EMULSION INTRAVENOUS at 10:56

## 2020-08-24 RX ADMIN — PROPOFOL 50 MG: 10 INJECTION, EMULSION INTRAVENOUS at 10:45

## 2020-08-24 RX ADMIN — PROPOFOL 20 MG: 10 INJECTION, EMULSION INTRAVENOUS at 10:53

## 2020-08-24 RX ADMIN — LIDOCAINE HYDROCHLORIDE 50 MG: 10 INJECTION, SOLUTION INFILTRATION; PERINEURAL at 10:44

## 2020-08-24 RX ADMIN — PROPOFOL 20 MG: 10 INJECTION, EMULSION INTRAVENOUS at 10:47

## 2020-08-24 RX ADMIN — PROPOFOL 20 MG: 10 INJECTION, EMULSION INTRAVENOUS at 11:01

## 2020-08-24 ASSESSMENT — PULMONARY FUNCTION TESTS
PIF_VALUE: 0

## 2020-08-24 ASSESSMENT — PAIN SCALES - GENERAL
PAINLEVEL_OUTOF10: 0

## 2020-08-24 ASSESSMENT — PAIN - FUNCTIONAL ASSESSMENT: PAIN_FUNCTIONAL_ASSESSMENT: 0-10

## 2020-08-24 NOTE — H&P
General Surgery:  H&P        PATIENT NAME: Symone Ramos   YOB: 1955    ADMISSION DATE: 8/24/2020  8:35 AM     Admitting Provider: Avel Solo    TODAY'S DATE: 8/24/2020    Chief Complaint:  Positive cologuard    HISTORY OF PRESENT ILLNESS:  The patient is a 76 y.o. female  with + cologuard on screening. Patient states she has had a colonoscopy roughly 15 to 20 years ago which was normal.  She has a maternal aunt who was diagnosed with colon cancer in her 76s, and a maternal half brother who was diagnosed in early 62s with colon cancer. She denies blood in her stool. No changes in bowel habits. Had some nausea last night with the prep. Her stool has still liquid brown this AM.      Past Medical History:        Diagnosis Date    Allergic rhinitis     GERD (gastroesophageal reflux disease)     Hyperlipidemia        Past Surgical History:        Procedure Laterality Date    BLADDER REPAIR      BREAST SURGERY      lumpectomy    COLONOSCOPY      HYSTERECTOMY      TONSILLECTOMY         Medications Prior to Admission:   Medications Prior to Admission: Multiple Vitamins-Minerals (HM MULTIVITAMIN ADULT GUMMY PO), Take 1 each by mouth  estradiol (VIVELLE) 0.1 MG/24HR, Place 1 patch onto the skin See Admin Instructions RX [id number] (2019) NDC 9588-0686-29 Estradiol 0.1 MG/D BIWK PAT BREWSTER QTY 8 Apply 1 PATCH TO SKIN TWO TIMES A WEEK RF 11 TIMES  spironolactone-hydrochlorothiazide (ALDACTAZIDE) 25-25 MG per tablet, Take 1 tablet by mouth daily  citalopram (CELEXA) 20 MG tablet, Take 1 tablet by mouth every morning (Patient taking differently: Take 10 mg by mouth every morning )  esomeprazole (NEXIUM) 20 MG delayed release capsule, TAKE 1 CAPSULE DAILY  scopolamine (TRANSDERM-SCOP) transdermal patch, APPLY 1 PATCH TOPICALLY EVERY 72 HOURS  mometasone (NASONEX) 50 MCG/ACT nasal spray, 2 sprays by Nasal route daily as needed (nasal congestion)  UNABLE TO FIND, Take 1 capsule by mouth nightly.  Progesterone DHEA 7 keto DHEA 75-10 SR    Allergies:  Tape Tally Ashing tape]    Social History:   Social History     Socioeconomic History    Marital status:      Spouse name: Not on file    Number of children: Not on file    Years of education: Not on file    Highest education level: Not on file   Occupational History    Not on file   Social Needs    Financial resource strain: Not on file    Food insecurity     Worry: Not on file     Inability: Not on file    Transportation needs     Medical: Not on file     Non-medical: Not on file   Tobacco Use    Smoking status: Former Smoker     Packs/day: 1.00     Years: 30.00     Pack years: 30.00     Types: Cigarettes     Last attempt to quit: 10/8/1988     Years since quittin.8    Smokeless tobacco: Never Used   Substance and Sexual Activity    Alcohol use: No    Drug use: No    Sexual activity: Yes     Partners: Male     Birth control/protection: Surgical     Comment: hyst   Lifestyle    Physical activity     Days per week: Not on file     Minutes per session: Not on file    Stress: Not on file   Relationships    Social connections     Talks on phone: Not on file     Gets together: Not on file     Attends [employer] service: Not on file     Active member of club or organization: Not on file     Attends meetings of clubs or organizations: Not on file     Relationship status: Not on file    Intimate partner violence     Fear of current or ex partner: Not on file     Emotionally abused: Not on file     Physically abused: Not on file     Forced sexual activity: Not on file   Other Topics Concern    Not on file   Social History Narrative    Not on file       Family History:       Problem Relation Age of Onset    Breast Cancer Mother     Obesity Mother     Obesity Father     Obesity Brother     Hypertension Maternal Aunt        REVIEW OF SYSTEMS:    16 point review of systems negative except on HPI    PHYSICAL EXAM:    VITALS:  /74   Pulse 91   Temp 97.1 °F (36.2 °C) (Temporal)   Resp 16   Ht 5' (1.524 m)   Wt 181 lb 12.8 oz (82.5 kg)   LMP 07/25/1986 (Within Months)   SpO2 95%   BMI 35.51 kg/m²   INTAKE/OUTPUT:   No intake or output data in the 24 hours ending 08/24/20 0948    CONSTITUTIONAL:  awake, alert, not distressed and mildly obese  ENT:  normocephalic/atraumatic, without obvious abnormality  NECK:  supple, symmetrical, trachea midline   LUNGS:  CTA bilaterally  CARDIOVASCULAR:  regular rate and rhythm and No Murmur  ABDOMEN: soft, non tender, non distended   MUSCULOSKELETAL: Muscle strength intact in all extremities bilaterally. NEUROLOGIC: CN II- XII intact. Gross motor intact without focal weakness. SKIN: No cyanosis, rashes, or edema noted. CBC with Differential:    Lab Results   Component Value Date    WBC 12.2 06/26/2018    RBC 4.90 06/26/2018    HGB 14.4 06/26/2018    HCT 42.3 06/26/2018     06/26/2018    MCV 86.3 06/26/2018    MCH 29.4 06/26/2018    MCHC 34.1 06/26/2018    RDW 13.0 06/26/2018    LYMPHOPCT 11 06/26/2018    MONOPCT 8 06/26/2018    BASOPCT 1 06/26/2018    MONOSABS 0.90 06/26/2018    LYMPHSABS 1.40 06/26/2018    EOSABS 0.10 06/26/2018    BASOSABS 0.10 06/26/2018    DIFFTYPE NOT REPORTED 06/26/2018     CMP:    Lab Results   Component Value Date     06/26/2018    K 3.4 06/26/2018    CL 97 06/26/2018    CO2 30 06/26/2018    BUN 11 06/26/2018    CREATININE 0.67 06/26/2018    GFRAA >60 06/26/2018    LABGLOM >60 06/26/2018    GLUCOSE 126 06/26/2018    PROT 7.4 06/26/2018    LABALBU 3.8 06/26/2018    CALCIUM 9.2 06/26/2018    BILITOT 0.43 06/26/2018    ALKPHOS 74 06/26/2018    AST 24 06/26/2018    ALT 17 06/26/2018       Pertinent Radiology:   No results found. ASSESSMENT:  There are no active hospital problems to display for this patient. 1. + cologuard  2. Colonoscopy    Plan:  1. Plan for colonoscopy today, will proceed  2.  Risks, benefits, alternatives explained, consent obtained      Electronically signed by Sarah Baeza DO  on 8/24/2020 at 9:48 AM

## 2020-08-24 NOTE — ANESTHESIA POSTPROCEDURE EVALUATION
Department of Anesthesiology  Postprocedure Note    Patient: Baltazar Vargas  MRN: [id number]  YOB: 1955  Date of evaluation: 8/24/2020  Time:  11:48 AM     Procedure Summary     Date:  08/24/20 Room / Location:  [address] [place] [address]    Anesthesia Start:  4600 Anesthesia Stop:  1115    Procedure:  COLONOSCOPY DIAGNOSTIC (N/A ) Diagnosis:  (POSITIVE COLOGUARD)    Surgeon:  Levester Gottron, DO Responsible Provider:  PHILLIP Cobos CRNA    Anesthesia Type:  MAC ASA Status:  2          Anesthesia Type: MAC    Jacinto Phase I: Jacinto Score: 10    Jacinto Phase II: Jacinto Score: 8    Last vitals: Reviewed and per EMR flowsheets.        Anesthesia Post Evaluation    Patient location during evaluation: PACU  Patient participation: complete - patient participated  Level of consciousness: awake and alert  Airway patency: patent  Nausea & Vomiting: no nausea and no vomiting  Complications: no  Cardiovascular status: hemodynamically stable  Respiratory status: nasal cannula and spontaneous ventilation  Hydration status: euvolemic

## 2020-08-24 NOTE — ANESTHESIA PRE PROCEDURE
Department of Anesthesiology  Preprocedure Note       Name:  Cheri Fermin   Age:  76 y.o.  :  1955                                          MRN:  [id number]         Date:  2020      Surgeon: Mehran Pardo):  Roxann Woods IV, DO    Procedure: Procedure(s):  COLONOSCOPY DIAGNOSTIC    Medications prior to admission:   Prior to Admission medications    Medication Sig Start Date End Date Taking? Authorizing Provider   Multiple Vitamins-Minerals (HM MULTIVITAMIN ADULT GUMMY PO) Take 1 each by mouth   Yes Historical Provider, MD   estradiol (VIVELLE) 0.1 MG/24HR Place 1 patch onto the skin See Admin Instructions RX [id number] (9814) [address] 5092-6457-57 Estradiol 0.1 MG/D BIWK PAT BREWSTER QTY 8 Apply 1 PATCH TO SKIN TWO TIMES A WEEK RF 11 TIMES 20 Yes Kylee Milligan DO   spironolactone-hydrochlorothiazide (ALDACTAZIDE) 25-25 MG per tablet Take 1 tablet by mouth daily 19 Yes Kylee Milligan DO   citalopram (CELEXA) 20 MG tablet Take 1 tablet by mouth every morning  Patient taking differently: Take 10 mg by mouth every morning  19  Yes Kylee Milligan DO   esomeprazole (NEXIUM) 20 MG delayed release capsule TAKE 1 CAPSULE DAILY 19  Yes Kylee Milligan DO   scopolamine (TRANSDERM-SCOP) transdermal patch APPLY 1 PATCH TOPICALLY EVERY 72 HOURS 19   Kylee Milligan DO   mometasone (NASONEX) 50 MCG/ACT nasal spray 2 sprays by Nasal route daily as needed (nasal congestion) 19   Kylee Milligan DO   UNABLE TO FIND Take 1 capsule by mouth nightly. Progesterone DHEA 7 keto DHEA 75-10 SR 3/13/14   Kylee Milligan DO       Current medications:    Current Facility-Administered Medications   Medication Dose Route Frequency Provider Last Rate Last Dose    lactated ringers infusion   Intravenous Continuous Ethan Menon  mL/hr at 20 0072         Allergies:     Allergies   Allergen Reactions    Tape [Adhesive Tape]      blisters Problem List:    Patient Active Problem List   Diagnosis Code    Hyperlipidemia E78.5    GERD (gastroesophageal reflux disease) K21.9    Allergic rhinitis J30.9    Family history of breast cancer Z80.3    Family history of colon cancer Z80.0       Past Medical History:        Diagnosis Date    Allergic rhinitis     GERD (gastroesophageal reflux disease)     Hyperlipidemia        Past Surgical History:        Procedure Laterality Date    BLADDER REPAIR      BREAST SURGERY      lumpectomy    COLONOSCOPY      HYSTERECTOMY      TONSILLECTOMY         Social History:    Social History     Tobacco Use    Smoking status: Former Smoker     Packs/day: 1.00     Years: 30.00     Pack years: 30.00     Types: Cigarettes     Last attempt to quit: 10/8/1988     Years since quittin.8    Smokeless tobacco: Never Used   Substance Use Topics    Alcohol use: No                                Counseling given: Not Answered      Vital Signs (Current):   Vitals:    20 1558 20 1440 20 0839   BP:   133/74   Pulse:   91   Resp:   16   Temp:   97.1 °F (36.2 °C)   TempSrc:   Temporal   SpO2:   95%   Weight: 180 lb (81.6 kg) 180 lb (81.6 kg) 181 lb 12.8 oz (82.5 kg)   Height: 5' 1\" (1.549 m) 5' 1\" (1.549 m) 5' (1.524 m)                                              BP Readings from Last 3 Encounters:   20 133/74   19 123/73   10/08/18 127/79       NPO Status: Time of last liquid consumption: 0400                        Time of last solid consumption: 1300                        Date of last liquid consumption: 20                        Date of last solid food consumption: 20    BMI:   Wt Readings from Last 3 Encounters:   20 181 lb 12.8 oz (82.5 kg)   19 183 lb 12.8 oz (83.4 kg)   10/08/18 183 lb 6.4 oz (83.2 kg)     Body mass index is 35.51 kg/m².     CBC:   Lab Results   Component Value Date    WBC 12.2 2018    RBC 4.90 2018    HGB 14.4 2018    HCT 42.3 06/26/2018    MCV 86.3 06/26/2018    RDW 13.0 06/26/2018     06/26/2018       CMP:   Lab Results   Component Value Date     06/26/2018    K 3.4 06/26/2018    CL 97 06/26/2018    CO2 30 06/26/2018    BUN 11 06/26/2018    CREATININE 0.67 06/26/2018    GFRAA >60 06/26/2018    LABGLOM >60 06/26/2018    GLUCOSE 126 06/26/2018    PROT 7.4 06/26/2018    CALCIUM 9.2 06/26/2018    BILITOT 0.43 06/26/2018    ALKPHOS 74 06/26/2018    AST 24 06/26/2018    ALT 17 06/26/2018       POC Tests: No results for input(s): POCGLU, POCNA, POCK, POCCL, POCBUN, POCHEMO, POCHCT in the last 72 hours. Coags: No results found for: PROTIME, INR, APTT    HCG (If Applicable): No results found for: PREGTESTUR, PREGSERUM, HCG, HCGQUANT     ABGs: No results found for: PHART, PO2ART, EHN9YLM, OOT4XOV, BEART, X8XVXHJY     Type & Screen (If Applicable):  No results found for: LABABO, LABRH    Drug/Infectious Status (If Applicable):  No results found for: HIV, HEPCAB    COVID-19 Screening (If Applicable):   Lab Results   Component Value Date    COVID19 Not Detected 08/20/2020         Anesthesia Evaluation  Patient summary reviewed and Nursing notes reviewed  Airway: Mallampati: III  TM distance: >3 FB   Neck ROM: full  Mouth opening: > = 3 FB Dental: normal exam         Pulmonary:Negative Pulmonary ROS and normal exam                              ROS comment: -[address] [facility] - 27 PACK YEARS   Cardiovascular:Negative CV ROS                      Neuro/Psych:   Negative Neuro/Psych ROS              GI/Hepatic/Renal:   (+) GERD:, morbid obesity          Endo/Other:                      ROS comment: -NPO AFTER MIDNIGHT  -NKDA Abdominal:           Vascular: negative vascular ROS. Anesthesia Plan      MAC     ASA 2       Induction: intravenous. MIPS: Postoperative opioids intended and Prophylactic antiemetics administered. Anesthetic plan and risks discussed with patient.       Plan discussed with CRNA.     Attending anesthesiologist reviewed and agrees with Pre Eval content              Tao Cruz MD   8/24/2020

## 2020-08-24 NOTE — BRIEF OP NOTE
Brief Postoperative Note      Patient: Megan Capps  YOB: 1955  MRN: [id number]    Date of Procedure: 8/24/2020    Pre-Op Diagnosis: POSITIVE COLOGUARD    Post-Op Diagnosis: Same       Procedure(s):  COLONOSCOPY DIAGNOSTIC    Surgeon(s):  Seferino Woods IV, DO    Assistant:  * No surgical staff found *    Anesthesia: Monitor Anesthesia Care    Estimated Blood Loss (mL): Minimal    Complications: None    Specimens:   * No specimens in log *    Implants:  * No implants in log *      Drains: * No LDAs found *    Findings: diverticula, hyperplastic polyp    Electronically signed by Paulo Barfield DO on 8/24/2020 at 12:03 PM

## 2020-08-25 NOTE — OP NOTE
Operative Note      Patient: Katya Stein  YOB: 1955  MRN: [id number]    Date of Procedure: 8/24/2020    Pre-Op Diagnosis: POSITIVE COLOGUARD    Post-Op Diagnosis: Same       Procedure(s):  COLONOSCOPY DIAGNOSTIC    Surgeon(s):  Corinne Woods IV, DO    Assistant:   Froylan Bowles DO PGY2    Anesthesia: Monitor Anesthesia Care    Estimated Blood Loss (mL): Minimal    Complications: None    Specimens:   * No specimens in log *    Implants:  * No implants in log *      Drains: * No LDAs found *    Findings: diverticula, hyperplastic polyp    Detailed Description of Procedure:     HISTORY: The patient is a 76y.o. year old female with + cologuard on screening.  Patient states she has had a colonoscopy roughly 15 to 20 years ago which was normal. Ángela Iverson has a maternal aunt who was diagnosed with colon cancer in her 76s, and a maternal half brother who was diagnosed in early 62s with colon cancer.  She denies blood in her stool.  No changes in bowel habits. Risks, benefits, and alternatives were explained to the patient; she understood and agreed. Consent was obtained. PROCEDURE: The patient was given IV conscious sedation per anesthesia. The colonoscope was inserted per rectum and advanced under direct vision to the cecum with minimal difficulty. Scope was withdrawn over a period greater than 6 minutes. The patient's prep was adequate. Findings:  Cecum/Ascending colon: Diverticula were present    Transverse colon: normal    Descending/Sigmoid colon: Diverticula were present    Rectum/Anus: Unable to preform retroflexion, diverticula along with multiple hyperplastic polyps were present    The colon was decompressed and the scope was removed. The patient tolerated the procedure well.        Electronically signed by Froylan Bowles DO on 8/24/2020 at 8:20 PM

## 2020-09-04 ENCOUNTER — HOSPITAL ENCOUNTER (OUTPATIENT)
Dept: MAMMOGRAPHY | Age: 65
Discharge: HOME | End: 2020-09-06
Payer: MEDICARE

## 2020-09-04 PROCEDURE — 77063 BREAST TOMOSYNTHESIS BI: CPT

## 2020-10-21 RX ORDER — CITALOPRAM 20 MG/1
20 TABLET ORAL EVERY MORNING
Qty: 90 TABLET | Refills: 3 | Status: SHIPPED | OUTPATIENT
Start: 2020-10-21 | End: 2021-09-14

## 2020-10-21 RX ORDER — SPIRONOLACTONE AND HYDROCHLOROTHIAZIDE 25; 25 MG/1; MG/1
1 TABLET ORAL DAILY
Qty: 90 TABLET | Refills: 3 | Status: SHIPPED | OUTPATIENT
Start: 2020-10-21 | End: 2021-09-14

## 2020-10-21 NOTE — TELEPHONE ENCOUNTER
Last Visit Date:  12-13-19  Next Visit Date:  Visit date not found    No results found for: LABA1C          ( goal A1C is < 7)   No results found for: LABMICR  LDL Cholesterol (mg/dL)   Date Value   07/03/2017 159 (H)       (goal LDL is <100)   AST (U/L)   Date Value   06/26/2018 24     ALT (U/L)   Date Value   06/26/2018 17     BUN (mg/dL)   Date Value   06/26/2018 11     BP Readings from Last 3 Encounters:   08/24/20 (!) 103/51   08/24/20 86/71   12/13/19 123/73          (goal 120/80)        Patient Active Problem List:     Hyperlipidemia     GERD (gastroesophageal reflux disease)     Allergic rhinitis     Family history of breast cancer     Family history of colon cancer      ----Kandis Amezquita

## 2020-12-30 RX ORDER — VALACYCLOVIR HYDROCHLORIDE 500 MG/1
TABLET, FILM COATED ORAL
Qty: 90 TABLET | Refills: 0 | Status: SHIPPED | OUTPATIENT
Start: 2020-12-30

## 2020-12-30 NOTE — TELEPHONE ENCOUNTER
E-scribe request for Valtrex 500 MG. Please review and e-scribe if applicable. Next Visit Date:  Visit date not found    Health Maintenance   Topic Date Due    Hepatitis C screen  1955    DTaP/Tdap/Td vaccine (1 - Tdap) 03/07/1973    Diabetes screen  03/07/1994    Shingles Vaccine (1 of 2) 03/07/2004    Pneumococcal 65+ years Vaccine (1 of 1 - PPSV23) 03/07/2019    Potassium monitoring  06/26/2019    Creatinine monitoring  06/26/2019    Annual Wellness Visit (AWV)  12/13/2019    Flu vaccine (1) 09/01/2020    Breast cancer screen  09/04/2021    Lipid screen  07/03/2022    Colon cancer screen colonoscopy  08/24/2030    DEXA (modify frequency per FRAX score)  Completed    Hepatitis A vaccine  Aged Out    Hepatitis B vaccine  Aged Out    Hib vaccine  Aged Out    Meningococcal (ACWY) vaccine  Aged Out             (applicable per patient's age: Cancer Screenings, Depression Screening, Fall Risk Screening, Immunizations)    LDL Cholesterol (mg/dL)   Date Value   07/03/2017 159 (H)     AST (U/L)   Date Value   06/26/2018 24     ALT (U/L)   Date Value   06/26/2018 17     BUN (mg/dL)   Date Value   06/26/2018 11      (goal A1C is < 7)   (goal LDL is <100) need 30-50% reduction from baseline     BP Readings from Last 3 Encounters:   08/24/20 (!) 103/51   08/24/20 86/71   12/13/19 123/73    (goal /80)      All Future Testing planned in [facility]:  Lab Frequency Next Occurrence       Next Visit Date:  No future appointments.          Patient Active Problem List:     Hyperlipidemia     GERD (gastroesophageal reflux disease)     Allergic rhinitis     Family history of breast cancer     Family history of colon cancer

## 2021-04-02 ENCOUNTER — OFFICE VISIT (OUTPATIENT)
Dept: FAMILY MEDICINE CLINIC | Age: 66
End: 2021-04-02
Payer: MEDICARE

## 2021-04-02 ENCOUNTER — HOSPITAL ENCOUNTER (OUTPATIENT)
Age: 66
Setting detail: SPECIMEN
Discharge: HOME | End: 2021-04-02
Payer: MEDICARE

## 2021-04-02 VITALS
OXYGEN SATURATION: 98 % | SYSTOLIC BLOOD PRESSURE: 130 MMHG | BODY MASS INDEX: 34.66 KG/M2 | HEART RATE: 82 BPM | DIASTOLIC BLOOD PRESSURE: 72 MMHG | TEMPERATURE: 96.8 F | WEIGHT: 183.6 LBS | HEIGHT: 61 IN

## 2021-04-02 DIAGNOSIS — E03.8 OTHER SPECIFIED HYPOTHYROIDISM: ICD-10-CM

## 2021-04-02 DIAGNOSIS — E78.2 MIXED HYPERLIPIDEMIA: ICD-10-CM

## 2021-04-02 DIAGNOSIS — I10 ESSENTIAL HYPERTENSION: ICD-10-CM

## 2021-04-02 DIAGNOSIS — E03.8 OTHER SPECIFIED HYPOTHYROIDISM: Primary | ICD-10-CM

## 2021-04-02 LAB
ALBUMIN SERPL-MCNC: 4.3 G/DL (ref 3.5–5.2)
ALBUMIN/GLOBULIN RATIO: 1.5 (ref 1–2.5)
ALP BLD-CCNC: 63 U/L (ref 35–104)
ALT SERPL-CCNC: 19 U/L (ref 5–33)
ANION GAP SERPL CALCULATED.3IONS-SCNC: 12 MMOL/L (ref 9–17)
AST SERPL-CCNC: 20 U/L
BILIRUB SERPL-MCNC: 0.32 MG/DL (ref 0.3–1.2)
BILIRUBIN DIRECT: 0.09 MG/DL
BILIRUBIN, INDIRECT: 0.23 MG/DL (ref 0–1)
BUN BLDV-MCNC: 11 MG/DL (ref 8–23)
BUN/CREAT BLD: ABNORMAL (ref 9–20)
CALCIUM SERPL-MCNC: 9.2 MG/DL (ref 8.6–10.4)
CHLORIDE BLD-SCNC: 97 MMOL/L (ref 98–107)
CHOLESTEROL/HDL RATIO: 4.5
CHOLESTEROL: 240 MG/DL
CO2: 29 MMOL/L (ref 20–31)
CREAT SERPL-MCNC: 0.59 MG/DL (ref 0.5–0.9)
GFR AFRICAN AMERICAN: >60 ML/MIN
GFR NON-AFRICAN AMERICAN: >60 ML/MIN
GFR SERPL CREATININE-BSD FRML MDRD: ABNORMAL ML/MIN/{1.73_M2}
GFR SERPL CREATININE-BSD FRML MDRD: ABNORMAL ML/MIN/{1.73_M2}
GLOBULIN: NORMAL G/DL (ref 1.5–3.8)
GLUCOSE BLD-MCNC: 110 MG/DL (ref 70–99)
HDLC SERPL-MCNC: 53 MG/DL
LDL CHOLESTEROL: 156 MG/DL (ref 0–130)
POTASSIUM SERPL-SCNC: 4.4 MMOL/L (ref 3.7–5.3)
SODIUM BLD-SCNC: 138 MMOL/L (ref 135–144)
THYROXINE, FREE: 1.06 NG/DL (ref 0.93–1.7)
TOTAL PROTEIN: 7.1 G/DL (ref 6.4–8.3)
TRIGL SERPL-MCNC: 153 MG/DL
TSH SERPL DL<=0.05 MIU/L-ACNC: 2.07 MIU/L (ref 0.3–5)
VLDLC SERPL CALC-MCNC: ABNORMAL MG/DL (ref 1–30)

## 2021-04-02 PROCEDURE — G8417 CALC BMI ABV UP PARAM F/U: HCPCS | Performed by: FAMILY MEDICINE

## 2021-04-02 PROCEDURE — 1036F TOBACCO NON-USER: CPT | Performed by: FAMILY MEDICINE

## 2021-04-02 PROCEDURE — 3288F FALL RISK ASSESSMENT DOCD: CPT | Performed by: FAMILY MEDICINE

## 2021-04-02 PROCEDURE — 1123F ACP DISCUSS/DSCN MKR DOCD: CPT | Performed by: FAMILY MEDICINE

## 2021-04-02 PROCEDURE — 4040F PNEUMOC VAC/ADMIN/RCVD: CPT | Performed by: FAMILY MEDICINE

## 2021-04-02 PROCEDURE — 3017F COLORECTAL CA SCREEN DOC REV: CPT | Performed by: FAMILY MEDICINE

## 2021-04-02 PROCEDURE — G8427 DOCREV CUR MEDS BY ELIG CLIN: HCPCS | Performed by: FAMILY MEDICINE

## 2021-04-02 PROCEDURE — 1090F PRES/ABSN URINE INCON ASSESS: CPT | Performed by: FAMILY MEDICINE

## 2021-04-02 PROCEDURE — 99213 OFFICE O/P EST LOW 20 MIN: CPT | Performed by: FAMILY MEDICINE

## 2021-04-02 PROCEDURE — G8399 PT W/DXA RESULTS DOCUMENT: HCPCS | Performed by: FAMILY MEDICINE

## 2021-04-02 ASSESSMENT — ENCOUNTER SYMPTOMS
CONSTIPATION: 0
EYE PAIN: 0
VOMITING: 0
NAUSEA: 0
SHORTNESS OF BREATH: 0
SORE THROAT: 0
COUGH: 0
BACK PAIN: 0

## 2021-04-02 ASSESSMENT — PATIENT HEALTH QUESTIONNAIRE - PHQ9
SUM OF ALL RESPONSES TO PHQ QUESTIONS 1-9: 0
2. FEELING DOWN, DEPRESSED OR HOPELESS: 0
1. LITTLE INTEREST OR PLEASURE IN DOING THINGS: 0

## 2021-04-02 NOTE — PROGRESS NOTES
sounds. No wheezing. Skin:     General: Skin is warm and dry. Neurological:      Mental Status: She is alert and oriented to person, place, and time. Psychiatric:         Behavior: Behavior normal.         Thought Content: Thought content normal.         Judgment: Judgment normal.           ASSESSMENT AND PLAN      Diagnosis Orders   1. Other specified hypothyroidism  TSH    T4, Free   2. Essential hypertension  Lipid Panel    Basic Metabolic Panel    Hepatic Function Panel   3. Mixed hyperlipidemia  Lipid Panel    Basic Metabolic Panel    Hepatic Function Panel         Citalopram -using 1/2 tablets. Need labs review. Medication updates as noted in orders. Patient has surgical menopause and we discussed use of estrogen supplementation for symptom management of menopause. Recheck 6 to 12 months.         Electronicallysigned by John Blanco DO on 4/2/2021 at 1:11 PM

## 2021-04-06 ENCOUNTER — TELEPHONE (OUTPATIENT)
Dept: FAMILY MEDICINE CLINIC | Age: 66
End: 2021-04-06

## 2021-04-06 NOTE — TELEPHONE ENCOUNTER
----- Message from Kaur Fuentes DO sent at 4/5/2021 12:32 PM EDT -----  Please advise Manolo that her cholesterol is about where it was a couple of years ago measuring today 240. I would recommend that she stay on her cholesterol medication as we discussed.       The remaining portions of her lab results were normal.

## 2021-04-06 NOTE — TELEPHONE ENCOUNTER
Writer spoke with patient to inform her of her Cholesterol results and to inform her to continue taking her medication for cholesterol. Patient informed writer that she has not taken that medication in about 3-4 years. She stated that the medication Simvastatin would make her sick and she stopped taking it. She stated if you need her to continue taking a cholesterol medication, you will need to send something other than her previous medication.

## 2021-04-08 DIAGNOSIS — E78.2 MIXED HYPERLIPIDEMIA: Primary | ICD-10-CM

## 2021-04-08 RX ORDER — CHOLESTYRAMINE 4 G/9G
1 POWDER, FOR SUSPENSION ORAL DAILY
Qty: 90 PACKET | Refills: 3 | Status: SHIPPED | OUTPATIENT
Start: 2021-04-08 | End: 2022-06-10 | Stop reason: SDUPTHER

## 2021-04-08 NOTE — TELEPHONE ENCOUNTER
Please call patient back and advise her that I ordered a medication to help lower the cholesterol. It is a powder form of a cholesterol medicine that does not absorbed into the bloodstream.  It works in the digestive tract by absorbing cholesterol and passing it out through the stool. Let her know it works a lot like a fiber supplement similar to Metamucil. Let her know that there will should be very little side effect if any at all with this. Please advise her that I sent it to her [address] and if she has any questions or concerns she can call me at my phone number which I know she has.   Thank you

## 2021-04-08 NOTE — TELEPHONE ENCOUNTER
Supplemental note to previous note-    The name of the medication is Questran Powder it mixes with water and you drink it once daily.

## 2021-04-09 NOTE — TELEPHONE ENCOUNTER
Patient was informed of the medication that was sent to the pharmacy for her with instructions on how to take. She was told if she has any questions, to call PCP. Patient acknowledged.

## 2021-06-01 RX ORDER — ESTRADIOL 0.1 MG/D
PATCH, EXTENDED RELEASE TRANSDERMAL
Qty: 8 PATCH | Refills: 0 | Status: SHIPPED | OUTPATIENT
Start: 2021-06-01 | End: 2021-07-27

## 2021-06-17 ENCOUNTER — PATIENT MESSAGE (OUTPATIENT)
Dept: FAMILY MEDICINE CLINIC | Age: 66
End: 2021-06-17

## 2021-06-17 DIAGNOSIS — J30.89 ALLERGIC RHINITIS DUE TO OTHER ALLERGIC TRIGGER, UNSPECIFIED SEASONALITY: Primary | ICD-10-CM

## 2021-06-17 DIAGNOSIS — H92.09 OTALGIA, UNSPECIFIED LATERALITY: ICD-10-CM

## 2021-06-18 RX ORDER — FEXOFENADINE HCL AND PSEUDOEPHEDRINE HCI 180; 240 MG/1; MG/1
1 TABLET, EXTENDED RELEASE ORAL DAILY
Qty: 30 TABLET | Refills: 0 | Status: SHIPPED | OUTPATIENT
Start: 2021-06-18

## 2021-06-18 NOTE — TELEPHONE ENCOUNTER
Prescription ordered in note return to patient. Will initiate Allegra-D and follow up with patient if not improved in the next few days.

## 2021-07-21 DIAGNOSIS — Z78.0 MENOPAUSE: Primary | ICD-10-CM

## 2021-07-21 NOTE — TELEPHONE ENCOUNTER
Last visit: 04/02/2021  Last Med refill: 06/02/2021  Does patient have enough medication for 72 hours: Yes    Next Visit Date:  No future appointments.     Health Maintenance   Topic Date Due    Hepatitis C screen  Never done    DTaP/Tdap/Td vaccine (1 - Tdap) Never done    Diabetes screen  Never done    Shingles Vaccine (1 of 2) Never done    Pneumococcal 65+ years Vaccine (1 of 1 - PPSV23) Never done   [employer] Visit (AWV)  Never done    Flu vaccine (1) 09/01/2021    Breast cancer screen  09/04/2021    Potassium monitoring  04/02/2022    Creatinine monitoring  04/02/2022    Lipid screen  04/02/2026    Colon cancer screen colonoscopy  08/24/2030    DEXA (modify frequency per FRAX score)  Completed    COVID-19 Vaccine  Completed    Hepatitis A vaccine  Aged Out    Hepatitis B vaccine  Aged Out    Hib vaccine  Aged Out    Meningococcal (ACWY) vaccine  Aged Out       No results found for: LABA1C          ( goal A1C is < 7)   No results found for: LABMICR  LDL Cholesterol (mg/dL)   Date Value   04/02/2021 156 (H)   07/03/2017 159 (H)       (goal LDL is <100)   AST (U/L)   Date Value   04/02/2021 20     ALT (U/L)   Date Value   04/02/2021 19     BUN (mg/dL)   Date Value   04/02/2021 11     BP Readings from Last 3 Encounters:   04/02/21 130/72   08/24/20 (!) 103/51   08/24/20 86/71          (goal 120/80)    All Future Testing planned in [facility]  Lab Frequency Next Occurrence               Patient Active Problem List:     Hyperlipidemia     GERD (gastroesophageal reflux disease)     Allergic rhinitis     Family history of breast cancer     Family history of colon cancer

## 2021-07-27 RX ORDER — ESTRADIOL 0.1 MG/D
PATCH, EXTENDED RELEASE TRANSDERMAL
Qty: 8 PATCH | Refills: 5 | Status: SHIPPED | OUTPATIENT
Start: 2021-07-27 | End: 2022-05-02

## 2021-08-24 ENCOUNTER — OFFICE VISIT (OUTPATIENT)
Dept: FAMILY MEDICINE CLINIC | Age: 66
End: 2021-08-24
Payer: MEDICARE

## 2021-08-24 VITALS
RESPIRATION RATE: 16 BRPM | TEMPERATURE: 97.2 F | WEIGHT: 178 LBS | HEIGHT: 61 IN | DIASTOLIC BLOOD PRESSURE: 84 MMHG | HEART RATE: 98 BPM | SYSTOLIC BLOOD PRESSURE: 130 MMHG | BODY MASS INDEX: 33.61 KG/M2

## 2021-08-24 DIAGNOSIS — Z00.00 ROUTINE GENERAL MEDICAL EXAMINATION AT A HEALTH CARE FACILITY: Primary | ICD-10-CM

## 2021-08-24 PROCEDURE — 1123F ACP DISCUSS/DSCN MKR DOCD: CPT | Performed by: FAMILY MEDICINE

## 2021-08-24 PROCEDURE — G0438 PPPS, INITIAL VISIT: HCPCS | Performed by: FAMILY MEDICINE

## 2021-08-24 PROCEDURE — 3017F COLORECTAL CA SCREEN DOC REV: CPT | Performed by: FAMILY MEDICINE

## 2021-08-24 PROCEDURE — 4040F PNEUMOC VAC/ADMIN/RCVD: CPT | Performed by: FAMILY MEDICINE

## 2021-08-24 ASSESSMENT — LIFESTYLE VARIABLES: HOW OFTEN DO YOU HAVE A DRINK CONTAINING ALCOHOL: 0

## 2021-08-24 ASSESSMENT — PATIENT HEALTH QUESTIONNAIRE - PHQ9
SUM OF ALL RESPONSES TO PHQ QUESTIONS 1-9: 0
SUM OF ALL RESPONSES TO PHQ QUESTIONS 1-9: 0
1. LITTLE INTEREST OR PLEASURE IN DOING THINGS: 0
2. FEELING DOWN, DEPRESSED OR HOPELESS: 0
SUM OF ALL RESPONSES TO PHQ QUESTIONS 1-9: 0
SUM OF ALL RESPONSES TO PHQ9 QUESTIONS 1 & 2: 0

## 2021-08-24 NOTE — PATIENT INSTRUCTIONS
Personalized Preventive Plan for Richard Segundo - 8/24/2021  Medicare offers a range of preventive health benefits. Some of the tests and screenings are paid in full while other may be subject to a deductible, co-insurance, and/or copay. Some of these benefits include a comprehensive review of your medical history including lifestyle, illnesses that may run in your family, and various assessments and screenings as appropriate. After reviewing your medical record and screening and assessments performed today your provider may have ordered immunizations, labs, imaging, and/or referrals for you. A list of these orders (if applicable) as well as your Preventive Care list are included within your After Visit Summary for your review. Other Preventive Recommendations:    · A preventive eye exam performed by an eye specialist is recommended every 1-2 years to screen for glaucoma; cataracts, macular degeneration, and other eye disorders. · A preventive dental visit is recommended every 6 months. · Try to get at least 150 minutes of exercise per week or 10,000 steps per day on a pedometer . · Order or download the FREE \"Exercise & Physical Activity: Your Everyday Guide\" from [employer]. Call 0-534.664.5339 or search [employer] online. · You need 4816-2942 mg of calcium and 2578-2060 IU of vitamin D per day. It is possible to meet your calcium requirement with diet alone, but a vitamin D supplement is usually necessary to meet this goal.  · When exposed to the sun, use a sunscreen that protects against both UVA and UVB radiation with an SPF of 30 or greater. Reapply every 2 to 3 hours or after sweating, drying off with a towel, or swimming. · Always wear a seat belt when traveling in a car. Always wear a helmet when riding a bicycle or motorcycle.

## 2021-08-24 NOTE — PROGRESS NOTES
Medicare Annual Wellness Visit  Name: Dee Dee Barnett Date: 2021   MRN: [id number] Sex: Female   Age: 78 y.o. Ethnicity: Non- / Non    : 1955 Race: White (non-)      Rossana Pacheco is here for Medicare AWV and Health Maintenance (declining at this time)    Screenings for behavioral, psychosocial and functional/safety risks, and cognitive dysfunction are all negative except as indicated below. These results, as well as other patient data from the [address] form, are documented in Flowsheets linked to this Encounter. Allergies   Allergen Reactions    Tape Rica Schilder Tape]      blisters       Prior to Visit Medications    Medication Sig Taking?  Authorizing Provider   AL 0.1 MG/24HR APPLY 1 PATCH TOPICALLY TWICE A WEEK AS DIRECTED  Manuel Davis, DO   fexofenadine-pseudoephedrine (ALLEGRA-D 24HR) 180-240 MG per extended release tablet Take 1 tablet by mouth daily  Manuel Davis, DO   cholestyramine (QUESTRAN) 4 g packet Take 1 packet by mouth daily  Manuel Davis, DO   Ascorbic Acid (VITAMIN C PO) Take by mouth daily  Historical Provider, MD   Ginkgo Biloba 120 MG CAPS Take 120 mg by mouth daily  Historical Provider, MD   valACYclovir (VALTREX) 500 MG tablet TAKE 1 CAPLET BY MOUTH THREE TIMES DAILY  Manuel Davis,    spironolactone-hydroCHLOROthiazide (ALDACTAZIDE) 25-25 MG per tablet TAKE 1 TABLET BY MOUTH  DAILY  Manuel Davis, DO   citalopram (CELEXA) 20 MG tablet TAKE 1 TABLET BY MOUTH  EVERY MORNING  Manuel Davis, DO   Multiple Vitamins-Minerals (HM MULTIVITAMIN ADULT GUMMY PO) Take 1 each by mouth  Historical Provider, MD   scopolamine (TRANSDERM-SCOP) transdermal patch APPLY 1 PATCH TOPICALLY EVERY 72 HOURS  Manuel Davis,    esomeprazole (NEXIUM) 20 MG delayed release capsule TAKE 1 CAPSULE DAILY  Jeramie Banks, DO   mometasone (NASONEX) 50 MCG/ACT nasal spray 2 sprays by Nasal route daily as needed (nasal congestion)  Patient not taking: Reported on 4/2/2021  Miguel Ramos DO   UNABLE TO FIND Take 1 capsule by mouth nightly. Progesterone DHEA 7 keto DHEA 75-10 SR  Patient not taking: Reported on 4/2/2021  Miguel Ramos DO       Past Medical History:   Diagnosis Date    Allergic rhinitis     GERD (gastroesophageal reflux disease)     Hyperlipidemia        Past Surgical History:   Procedure Laterality Date    BLADDER REPAIR      BREAST SURGERY      lumpectomy    COLONOSCOPY      COLONOSCOPY N/A 08/24/2020    : COLONOSCOPY DIAGNOSTIC (N/A )     COLONOSCOPY N/A 8/24/2020    COLONOSCOPY DIAGNOSTIC performed by Lilian Benton DO at [address] [place] [address]         Family History   Problem Relation Age of Onset    Breast Cancer Mother     Obesity Mother     Obesity Father     Obesity Brother     Hypertension Maternal Aunt        CareTeam (Including outside providers/suppliers regularly involved in providing care):   Patient Care Team:  Miguel Ramos DO as PCP - General  Miguel Ramos DO as PCP - [facility] Empaneled Provider    Wt Readings from Last 3 Encounters:   04/02/21 183 lb 9.6 oz (83.3 kg)   08/24/20 181 lb 12.8 oz (82.5 kg)   12/13/19 183 lb 12.8 oz (83.4 kg)     There were no vitals filed for this visit. There is no height or weight on file to calculate BMI. Based upon direct observation of the patient, evaluation of cognition reveals recent and remote memory intact. Patient's complete Health Risk Assessment and screening values have been reviewed and are found in Flowsheets. The following problems were reviewed today and where indicated follow up appointments were made and/or referrals ordered.     Positive Risk Factor Screenings with Interventions:          General Health and ACP:     Advance Directives     Power of  Living Will ACP-Advance Directive ACP-Power of     Not on File Not on File Not on File Not on File      General Health Risk Interventions:  · No Living Will: Advance Care Planning addressed with patient today    Health Habits/Nutrition:        Health Habits/Nutrition Interventions:  · Routinely sees the dentisit       Personalized Preventive Plan   Current Health Maintenance Status  Immunization History   Administered Date(s) Administered    COVID-19, [place], PF, 30mcg/0.3mL 02/25/2021, 03/18/2021    Influenza Virus Vaccine 10/01/2013, 01/11/2018    Influenza, Quadv, IM, PF (6 mo and older Fluzone, Flulaval, Fluarix, and 3 yrs and older Afluria) 10/09/2018    Zoster Recombinant (Shingrix) 02/01/2017        Health Maintenance   Topic Date Due    Hepatitis C screen  Never done    DTaP/Tdap/Td vaccine (1 - Tdap) Never done    Diabetes screen  Never done    Shingles Vaccine (2 of 2) 03/29/2017    Pneumococcal 65+ years Vaccine (1 of 1 - PPSV23) Never done   [employer] Visit (AWV)  Never done    Breast cancer screen  09/04/2021    Flu vaccine (1) 09/01/2021    Potassium monitoring  04/02/2022    Creatinine monitoring  04/02/2022    Lipid screen  04/02/2026    Colon cancer screen colonoscopy  08/24/2030    DEXA (modify frequency per FRAX score)  Completed    COVID-19 Vaccine  Completed    Hepatitis A vaccine  Aged Out    Hepatitis B vaccine  Aged Out    Hib vaccine  Aged Out    Meningococcal (ACWY) vaccine  Aged Out     Recommendations for [employer] Due: see orders and patient instructions/AVS.  . Recommended screening schedule for the next 5-10 years is provided to the patient in written form: see Patient Instructions/AVS.    Keira CEE LPN, 1/61/4788, performed the documented evaluation under the direct supervision of the attending physician.

## 2021-09-13 DIAGNOSIS — F41.9 ANXIETY: ICD-10-CM

## 2021-09-13 DIAGNOSIS — I10 ESSENTIAL HYPERTENSION: ICD-10-CM

## 2021-09-14 RX ORDER — SPIRONOLACTONE AND HYDROCHLOROTHIAZIDE 25; 25 MG/1; MG/1
1 TABLET ORAL DAILY
Qty: 90 TABLET | Refills: 3 | Status: SHIPPED | OUTPATIENT
Start: 2021-09-14 | End: 2022-08-01

## 2021-09-14 RX ORDER — CITALOPRAM 20 MG/1
TABLET ORAL
Qty: 90 TABLET | Refills: 3 | Status: SHIPPED | OUTPATIENT
Start: 2021-09-14 | End: 2022-08-01

## 2021-12-13 DIAGNOSIS — R42 VERTIGO: ICD-10-CM

## 2021-12-13 RX ORDER — SCOPOLAMINE 1 MG/3 DAY
PATCH,TRANSDERMAL 3 DAY TRANSDERMAL
Qty: 4 PATCH | Refills: 0 | Status: SHIPPED | OUTPATIENT
Start: 2021-12-13

## 2021-12-13 NOTE — TELEPHONE ENCOUNTER
Patches pending for refill     Health Maintenance   Topic Date Due    Hepatitis C screen  Never done    DTaP/Tdap/Td vaccine (1 - Tdap) Never done    Diabetes screen  Never done    Shingles Vaccine (2 of 2) 03/29/2017    Pneumococcal 65+ years Vaccine (1 of 1 - PPSV23) Never done    Flu vaccine (1) 09/01/2021    Breast cancer screen  09/04/2021    COVID-19 Vaccine (3 - Booster for Pfizer series) 09/18/2021    Potassium monitoring  04/02/2022    Creatinine monitoring  04/02/2022    Annual Wellness Visit (AWV)  08/25/2022    Lipid screen  04/02/2026    Colon cancer screen colonoscopy  08/24/2030    DEXA (modify frequency per FRAX score)  Completed    Hepatitis A vaccine  Aged Out    Hepatitis B vaccine  Aged Out    Hib vaccine  Aged Out    Meningococcal (ACWY) vaccine  Aged Out             (applicable per patient's age: Cancer Screenings, Depression Screening, Fall Risk Screening, Immunizations)    LDL Cholesterol (mg/dL)   Date Value   04/02/2021 156 (H)     AST (U/L)   Date Value   04/02/2021 20     ALT (U/L)   Date Value   04/02/2021 19     BUN (mg/dL)   Date Value   04/02/2021 11      (goal A1C is < 7)   (goal LDL is <100) need 30-50% reduction from baseline     BP Readings from Last 3 Encounters:   08/24/21 130/84   04/02/21 130/72   08/24/20 (!) 103/51    (goal /80)      All Future Testing planned in [facility]:  Lab Frequency Next Occurrence       Next Visit Date:  No future appointments.          Patient Active Problem List:     Hyperlipidemia     GERD (gastroesophageal reflux disease)     Allergic rhinitis     Family history of breast cancer     Family history of colon cancer

## 2022-04-29 DIAGNOSIS — Z78.0 MENOPAUSE: ICD-10-CM

## 2022-05-02 RX ORDER — ESTRADIOL 0.1 MG/D
PATCH, EXTENDED RELEASE TRANSDERMAL
Qty: 8 PATCH | Refills: 5 | Status: SHIPPED | OUTPATIENT
Start: 2022-05-02

## 2022-06-10 ENCOUNTER — OFFICE VISIT (OUTPATIENT)
Dept: FAMILY MEDICINE CLINIC | Age: 67
End: 2022-06-10
Payer: MEDICARE

## 2022-06-10 VITALS
TEMPERATURE: 97.8 F | DIASTOLIC BLOOD PRESSURE: 75 MMHG | BODY MASS INDEX: 32.02 KG/M2 | WEIGHT: 169.6 LBS | SYSTOLIC BLOOD PRESSURE: 131 MMHG | HEIGHT: 61 IN | HEART RATE: 78 BPM

## 2022-06-10 DIAGNOSIS — K62.89 HYPERTROPHY OF ANAL PAPILLAE: Primary | ICD-10-CM

## 2022-06-10 DIAGNOSIS — Z13.1 SCREENING FOR DIABETES MELLITUS: ICD-10-CM

## 2022-06-10 DIAGNOSIS — E78.2 MIXED HYPERLIPIDEMIA: ICD-10-CM

## 2022-06-10 LAB — HBA1C MFR BLD: 5.7 %

## 2022-06-10 PROCEDURE — 1036F TOBACCO NON-USER: CPT | Performed by: FAMILY MEDICINE

## 2022-06-10 PROCEDURE — G8427 DOCREV CUR MEDS BY ELIG CLIN: HCPCS | Performed by: FAMILY MEDICINE

## 2022-06-10 PROCEDURE — 99213 OFFICE O/P EST LOW 20 MIN: CPT | Performed by: FAMILY MEDICINE

## 2022-06-10 PROCEDURE — G8417 CALC BMI ABV UP PARAM F/U: HCPCS | Performed by: FAMILY MEDICINE

## 2022-06-10 PROCEDURE — G8399 PT W/DXA RESULTS DOCUMENT: HCPCS | Performed by: FAMILY MEDICINE

## 2022-06-10 PROCEDURE — 1090F PRES/ABSN URINE INCON ASSESS: CPT | Performed by: FAMILY MEDICINE

## 2022-06-10 PROCEDURE — 1123F ACP DISCUSS/DSCN MKR DOCD: CPT | Performed by: FAMILY MEDICINE

## 2022-06-10 PROCEDURE — 3017F COLORECTAL CA SCREEN DOC REV: CPT | Performed by: FAMILY MEDICINE

## 2022-06-10 PROCEDURE — 83036 HEMOGLOBIN GLYCOSYLATED A1C: CPT | Performed by: FAMILY MEDICINE

## 2022-06-10 RX ORDER — CHOLESTYRAMINE 4 G/9G
1 POWDER, FOR SUSPENSION ORAL DAILY
Qty: 90 PACKET | Refills: 3 | Status: SHIPPED | OUTPATIENT
Start: 2022-06-10

## 2022-06-10 SDOH — ECONOMIC STABILITY: FOOD INSECURITY: WITHIN THE PAST 12 MONTHS, YOU WORRIED THAT YOUR FOOD WOULD RUN OUT BEFORE YOU GOT MONEY TO BUY MORE.: NEVER TRUE

## 2022-06-10 SDOH — ECONOMIC STABILITY: FOOD INSECURITY: WITHIN THE PAST 12 MONTHS, THE FOOD YOU BOUGHT JUST DIDN'T LAST AND YOU DIDN'T HAVE MONEY TO GET MORE.: NEVER TRUE

## 2022-06-10 ASSESSMENT — PATIENT HEALTH QUESTIONNAIRE - PHQ9
SUM OF ALL RESPONSES TO PHQ QUESTIONS 1-9: 0
1. LITTLE INTEREST OR PLEASURE IN DOING THINGS: 0
SUM OF ALL RESPONSES TO PHQ9 QUESTIONS 1 & 2: 0
2. FEELING DOWN, DEPRESSED OR HOPELESS: 0
SUM OF ALL RESPONSES TO PHQ QUESTIONS 1-9: 0

## 2022-06-10 ASSESSMENT — SOCIAL DETERMINANTS OF HEALTH (SDOH): HOW HARD IS IT FOR YOU TO PAY FOR THE VERY BASICS LIKE FOOD, HOUSING, MEDICAL CARE, AND HEATING?: NOT HARD AT ALL

## 2022-06-10 NOTE — PROGRESS NOTES
Visit Information    Have you changed or started any medications since your last visit including any over-the-counter medicines, vitamins, or herbal medicines? no   Have you stopped taking any of your medications? Is so, why? -  no  Are you having any side effects from any of your medications? - no    Have you seen any other physician or provider since your last visit?  no   Have you had any other diagnostic tests since your last visit? yes - Labs   Have you been seen in the emergency room and/or had an admission in a hospital since we last saw you?  no   Have you had your routine dental cleaning in the past 6 months?  no     Do you have an active MyChart account? If no, what is the barrier?   Yes    Patient Care Team:  Robert Estrada,  as PCP - [address] [place] [address],  as PCP - [facility]    Medical History Review  Past Medical, Family, and Social History reviewed and does not contribute to the patient presenting condition    Health Maintenance   Topic Date Due    Hepatitis C screen  Never done    DTaP/Tdap/Td vaccine (1 - Tdap) Never done    Diabetes screen  Never done    Shingles vaccine (2 of 2) 03/29/2017    Pneumococcal 65+ years Vaccine (1 - PCV) Never done    Breast cancer screen  09/04/2021    Depression Screen  08/24/2022    Annual Wellness Visit (AWV)  08/25/2022    Lipids  04/02/2026    Colorectal Cancer Screen  08/24/2030    DEXA (modify frequency per FRAX score)  Completed    Flu vaccine  Completed    COVID-19 Vaccine  Completed    Hepatitis A vaccine  Aged Out    Hepatitis B vaccine  Aged Out    Hib vaccine  Aged Out    Meningococcal (ACWY) vaccine  Aged Out
no

## 2022-07-14 ENCOUNTER — TELEPHONE (OUTPATIENT)
Dept: FAMILY MEDICINE CLINIC | Age: 67
End: 2022-07-14

## 2022-07-14 DIAGNOSIS — H60.509 ACUTE OTITIS EXTERNA, UNSPECIFIED LATERALITY, UNSPECIFIED TYPE: Primary | ICD-10-CM

## 2022-07-14 RX ORDER — CETIRIZINE HYDROCHLORIDE 10 MG/1
10 TABLET ORAL DAILY
Qty: 14 TABLET | Refills: 0 | Status: SHIPPED | OUTPATIENT
Start: 2022-07-14 | End: 2022-07-28

## 2022-07-14 RX ORDER — AZITHROMYCIN 250 MG/1
250 TABLET, FILM COATED ORAL SEE ADMIN INSTRUCTIONS
Qty: 6 TABLET | Refills: 0 | Status: SHIPPED | OUTPATIENT
Start: 2022-07-14 | End: 2022-07-19

## 2022-07-14 NOTE — TELEPHONE ENCOUNTER
Patient call - suspected ear infection. Reports some discomfort, mild dizzyness and nausea. Plan - Rx - ZPack / AntiHx.

## 2022-07-30 DIAGNOSIS — F41.9 ANXIETY: ICD-10-CM

## 2022-07-30 DIAGNOSIS — I10 ESSENTIAL HYPERTENSION: ICD-10-CM

## 2022-08-01 RX ORDER — CITALOPRAM 20 MG/1
TABLET ORAL
Qty: 90 TABLET | Refills: 3 | Status: SHIPPED | OUTPATIENT
Start: 2022-08-01

## 2022-08-01 RX ORDER — SPIRONOLACTONE AND HYDROCHLOROTHIAZIDE 25; 25 MG/1; MG/1
1 TABLET ORAL DAILY
Qty: 90 TABLET | Refills: 3 | Status: SHIPPED | OUTPATIENT
Start: 2022-08-01 | End: 2023-08-01

## 2022-08-22 ENCOUNTER — HOSPITAL ENCOUNTER (EMERGENCY)
Age: 67
Discharge: HOME | End: 2022-08-22
Attending: EMERGENCY MEDICINE
Payer: MEDICARE

## 2022-08-22 ENCOUNTER — APPOINTMENT (OUTPATIENT)
Dept: GENERAL RADIOLOGY | Age: 67
End: 2022-08-22
Payer: MEDICARE

## 2022-08-22 VITALS
WEIGHT: 165 LBS | BODY MASS INDEX: 31.15 KG/M2 | SYSTOLIC BLOOD PRESSURE: 136 MMHG | OXYGEN SATURATION: 95 % | DIASTOLIC BLOOD PRESSURE: 76 MMHG | HEIGHT: 61 IN | RESPIRATION RATE: 14 BRPM | TEMPERATURE: 98.2 F | HEART RATE: 90 BPM

## 2022-08-22 DIAGNOSIS — S93.601A SPRAIN OF RIGHT FOOT, INITIAL ENCOUNTER: Primary | ICD-10-CM

## 2022-08-22 PROCEDURE — 73630 X-RAY EXAM OF FOOT: CPT

## 2022-08-22 PROCEDURE — 99283 EMERGENCY DEPT VISIT LOW MDM: CPT

## 2022-08-22 RX ORDER — IBUPROFEN 600 MG/1
600 TABLET ORAL EVERY 8 HOURS PRN
Qty: 20 TABLET | Refills: 0 | Status: SHIPPED | OUTPATIENT
Start: 2022-08-22

## 2022-08-22 ASSESSMENT — PAIN SCALES - GENERAL: PAINLEVEL_OUTOF10: 8

## 2022-08-22 ASSESSMENT — PAIN DESCRIPTION - PAIN TYPE: TYPE: ACUTE PAIN

## 2022-08-22 ASSESSMENT — PAIN - FUNCTIONAL ASSESSMENT: PAIN_FUNCTIONAL_ASSESSMENT: 0-10

## 2022-08-22 ASSESSMENT — PAIN DESCRIPTION - LOCATION: LOCATION: FOOT

## 2022-08-22 ASSESSMENT — PAIN DESCRIPTION - ORIENTATION: ORIENTATION: RIGHT

## 2022-08-22 NOTE — ED PROVIDER NOTES
[id number] [facility]  [address] [facility] [address] [address]  Phone: [phone]  Fax: [phone]        Pt Name: Sourav Forman  MRN: [id number]  [place] 1955  Date of evaluation: 8/22/22      CHIEF COMPLAINT     Chief Complaint   Patient presents with    Foot Injury     Pt presents with co right foot injury that occurred yesterday afternoon around 1600. Pt states she was on a step stool reaching into cupboard when the stool broke causing her to fall injuring the right lateral side of foot. HISTORY OF PRESENT ILLNESS  (Location/Symptom, Timing/Onset, Context/Setting, Quality, Duration, Modifying Factors, Severity.)    Sourav Forman is a 75 y.o. female who presents with right foot pain. She fell from a stool and       REVIEW OF SYSTEMS    (2-9 systems for level 4, 10 or more for level 5)     Constitutional: no fever, chills, fatigue  HENT: No headache, nasal congestion, sore throat, hearing changes, ear pain or discharge  Eyes: no visual changes or photophobia  Respiratory: no cough, shortness of breath, or wheezing  Cardiovascular: no chest pain, palpitations, or leg swelling  Abdominal: no abdominal pain, nausea, vomiting, diarrhea, or constipation  Genitourinary: no dysuria, frequency, or urgency  Musculoskeletal: no arthralgias, myalgias, neck or back pain  Skin: no rash or wound  Neurological: no numbness, tingling or weakness  Hematologic:  no history of easy bleeding or bruising          PAST MEDICAL HISTORY    has a past medical history of Allergic rhinitis, GERD (gastroesophageal reflux disease), and Hyperlipidemia. SURGICAL HISTORY      has a past surgical history that includes Tonsillectomy; Hysterectomy; bladder repair; Breast surgery; Colonoscopy; Colonoscopy (N/A, 08/24/2020); and Colonoscopy (N/A, 8/24/2020).     CURRENTMEDICATIONS       Previous Medications    ASCORBIC ACID (VITAMIN C PO)    Take by mouth daily    CHOLESTYRAMINE (QUESTRAN) 4 G PACKET    Take 1 Exam  Vitals and nursing note reviewed. Constitutional:       Appearance: Normal appearance. HENT:      Head: Normocephalic and atraumatic. Eyes:      Extraocular Movements: Extraocular movements intact. Pupils: Pupils are equal, round, and reactive to light. Musculoskeletal:         General: Tenderness and signs of injury present. No swelling or deformity. Normal range of motion. Cervical back: No tenderness. Right lower leg: No edema. Left lower leg: No edema. Right foot: Normal range of motion and normal capillary refill. Tenderness and bony tenderness present. No swelling, deformity, bunion, Charcot foot, laceration or crepitus. Normal pulse. Left foot: Normal.      Comments: Tenderness over the 4th and 5th metatarsals. No deformity. No evidence of open fracture. DP and PT pulses are palpable and symmetrical. Distal motor and sensory intact. No tenderness over the ankle. Skin:     General: Skin is warm and dry. Capillary Refill: Capillary refill takes less than 2 seconds. Neurological:      Mental Status: She is alert and oriented to person, place, and time. Mental status is at baseline. Psychiatric:         Mood and Affect: Mood normal.         Behavior: Behavior normal.         Thought Content: Thought content normal.       DIFFERENTIAL DIAGNOSIS/ MDM:     The patient presented with foot pain. A fracture was not detected on X-ray. The ankle and knee joints were not affected. No skin lesions were seen. There are no signs of compartment syndrome. The pulses are 2/4. The motor is 5/5. The sensation is intact. The patient was advised to return to the Emergency Department for increasing pain, numbness, weakness, or coldness to the extremity. The patient was further instructed to follow up in 2-3 days with their family doctor or orthopedics. The patient voiced understanding of these instructions.     The patient understands that at this time there is no evidence for a more malignant underlying process, but the patient also understands that early in the process of an illness or injury, an emergency department workup can be falsely reassuring. Routine discharge counseling was given, and the patient understands that worsening, changing or persistent symptoms should prompt an immediate call or follow up with their primary physician or return to the emergency department. The importance of appropriate follow up was also discussed. I have reviewed the disposition diagnosis with the patient and or their family/guardian. I have answered their questions and given discharge instructions. They voiced understanding of these instructions and did not have any further questions or complaints. DIAGNOSTIC RESULTS     EKG: All EKG's are interpreted by the Emergency Department Physician who either signs or Co-signs this chart in the absence of a cardiologist.    none    RADIOLOGY:        Interpretation per the Radiologist below, if available at the time of this note:    XR FOOT RIGHT (MIN 3 VIEWS)    Result Date: 8/22/2022  EXAMINATION: THREE XRAY VIEWS OF THE RIGHT FOOT 8/22/2022 7:01 am COMPARISON: None. HISTORY: ORDERING SYSTEM PROVIDED HISTORY: Pain TECHNOLOGIST PROVIDED HISTORY: Pain Reason for Exam: right lateral foot pain from falling off stool yesterday FINDINGS: No fractures or dislocations. No suspicious focal bony lesions. No bony erosions or bony destructive changes. Enthesopathy off the calcaneus at both plantar fascial and to a lesser extent calcaneal attachments. No degenerative changes noted. Lisfranc alignment is maintained. No acute soft tissue abnormalities. No acute abnormality. LABS:  No results found for this visit on 08/22/22.     none    EMERGENCY DEPARTMENT COURSE:   Vitals:    Vitals:    08/22/22 0701   BP: 136/76   Pulse: 90   Resp: 14   Temp: 98.2 °F (36.8 °C)   TempSrc: Oral   SpO2: 95%   Weight: 74.8 kg (165 lb)   Height: 5' 1\" (1.549 m) -------------------------  BP: 136/76, Temp: 98.2 °F (36.8 °C), Heart Rate: 90, Resp: 14          CONSULTS:  none    PROCEDURES:  None    FINAL IMPRESSION      1.  Sprain of right foot, initial encounter          DISPOSITION/PLAN   DISPOSITION Decision To Discharge 08/22/2022 07:19:06 AM      CONDITION ON DISPOSITION:   Stable     PATIENT REFERRED TO:  Andrew Emerson, [address] [address] [phone]    Schedule an appointment as soon as possible for a visit in 3 days      DISCHARGE MEDICATIONS:  New Prescriptions    IBUPROFEN (IBU) 600 MG TABLET    Take 1 tablet by mouth every 8 hours as needed for Pain       (Please note that portions of this note were completed with a voicerecognition program.  Efforts were made to edit the dictations but occasionally words are mis-transcribed.)    Ortiz Collier MD  Attending Emergency Medicine Physician       Ortiz Collier MD  08/22/22 1138

## 2022-08-22 NOTE — ED NOTES
Patient provided with discharge instructions, prescriptions, and follow up information. Verbalized understanding. No IV access to discontinue. A&OX3. Wheelchair provided for discharge.       Lurene Lanes, RN  08/22/22 [id number]

## 2022-08-26 ENCOUNTER — PATIENT MESSAGE (OUTPATIENT)
Dept: FAMILY MEDICINE CLINIC | Age: 67
End: 2022-08-26

## 2022-08-26 DIAGNOSIS — R42 VERTIGO: Primary | ICD-10-CM

## 2022-08-26 RX ORDER — MECLIZINE HYDROCHLORIDE 25 MG/1
25 TABLET ORAL 3 TIMES DAILY PRN
Qty: 15 TABLET | Refills: 1 | Status: SHIPPED | OUTPATIENT
Start: 2022-08-26 | End: 2022-09-05

## 2022-08-26 NOTE — TELEPHONE ENCOUNTER
Patient message. Patient states that she has waxing and waning dizziness. No nausea no vomiting. We did chart review together and discussed the fact that she injured her foot 4 days ago. She states she has had a rough week. She feels this may be side effects or symptoms related to her  having had COVID a few days earlier. Nonetheless she is clinically stable she is at home and she is trying to stay mostly in a seated or reclined position. She is not having any other symptoms. We have discussed the use of meclizine for vertigo on a temporary basis. I will be sending her prescription for 15 tablets to the pharmacy. We will follow-up with her on an as-needed basis if not better in the next few days.

## 2022-10-09 ENCOUNTER — APPOINTMENT (OUTPATIENT)
Dept: CT IMAGING | Age: 67
DRG: 343 | End: 2022-10-09
Payer: MEDICARE

## 2022-10-09 ENCOUNTER — HOSPITAL ENCOUNTER (INPATIENT)
Age: 67
LOS: 1 days | Discharge: HOME | DRG: 343 | End: 2022-10-10
Attending: EMERGENCY MEDICINE | Admitting: HOSPITALIST
Payer: MEDICARE

## 2022-10-09 DIAGNOSIS — K35.80 ACUTE APPENDICITIS, UNSPECIFIED ACUTE APPENDICITIS TYPE: Primary | ICD-10-CM

## 2022-10-09 LAB
ABSOLUTE EOS #: 0.1 K/UL (ref 0–0.4)
ABSOLUTE LYMPH #: 2.1 K/UL (ref 1–4.8)
ABSOLUTE MONO #: 1.4 K/UL (ref 0.1–1.2)
ALBUMIN SERPL-MCNC: 4.1 G/DL (ref 3.5–5.2)
ALBUMIN/GLOBULIN RATIO: 1.2 (ref 1–2.5)
ALP BLD-CCNC: 62 U/L (ref 35–104)
ALT SERPL-CCNC: 10 U/L (ref 5–33)
ANION GAP SERPL CALCULATED.3IONS-SCNC: 12 MMOL/L (ref 9–17)
AST SERPL-CCNC: 17 U/L
BASOPHILS # BLD: 1 % (ref 0–2)
BASOPHILS ABSOLUTE: 0.1 K/UL (ref 0–0.2)
BILIRUB SERPL-MCNC: 0.4 MG/DL (ref 0.3–1.2)
BILIRUBIN URINE: NEGATIVE
BUN BLDV-MCNC: 10 MG/DL (ref 8–23)
CALCIUM SERPL-MCNC: 9.2 MG/DL (ref 8.6–10.4)
CHLORIDE BLD-SCNC: 98 MMOL/L (ref 98–107)
CO2: 27 MMOL/L (ref 20–31)
COLOR: YELLOW
COMMENT UA: NORMAL
CREAT SERPL-MCNC: 0.66 MG/DL (ref 0.5–0.9)
EOSINOPHILS RELATIVE PERCENT: 1 % (ref 1–4)
GFR SERPL CREATININE-BSD FRML MDRD: >60 ML/MIN/1.73M2
GLUCOSE BLD-MCNC: 107 MG/DL (ref 70–99)
GLUCOSE URINE: NEGATIVE
HCT VFR BLD CALC: 43.6 % (ref 36–46)
HEMOGLOBIN: 14.7 G/DL (ref 12–16)
KETONES, URINE: NEGATIVE
LEUKOCYTE ESTERASE, URINE: NEGATIVE
LIPASE: 26 U/L (ref 13–60)
LYMPHOCYTES # BLD: 13 % (ref 24–44)
MAGNESIUM: 1.9 MG/DL (ref 1.6–2.6)
MCH RBC QN AUTO: 29.2 PG (ref 26–34)
MCHC RBC AUTO-ENTMCNC: 33.6 G/DL (ref 31–37)
MCV RBC AUTO: 86.6 FL (ref 80–100)
MONOCYTES # BLD: 8 % (ref 2–11)
NITRITE, URINE: NEGATIVE
PDW BLD-RTO: 12.9 % (ref 12.5–15.4)
PH UA: 6.5 (ref 5–8)
PLATELET # BLD: 282 K/UL (ref 140–450)
PMV BLD AUTO: 9.1 FL (ref 6–12)
POTASSIUM SERPL-SCNC: 3.3 MMOL/L (ref 3.7–5.3)
PROTEIN UA: NEGATIVE
RBC # BLD: 5.03 M/UL (ref 4–5.2)
SEG NEUTROPHILS: 77 % (ref 36–66)
SEGMENTED NEUTROPHILS ABSOLUTE COUNT: 12.8 K/UL (ref 1.8–7.7)
SODIUM BLD-SCNC: 137 MMOL/L (ref 135–144)
SPECIFIC GRAVITY UA: 1.02 (ref 1–1.03)
TOTAL PROTEIN: 7.4 G/DL (ref 6.4–8.3)
TURBIDITY: CLEAR
URINE HGB: NEGATIVE
UROBILINOGEN, URINE: NORMAL
WBC # BLD: 16.4 K/UL (ref 3.5–11)

## 2022-10-09 PROCEDURE — 74176 CT ABD & PELVIS W/O CONTRAST: CPT

## 2022-10-09 PROCEDURE — 96365 THER/PROPH/DIAG IV INF INIT: CPT

## 2022-10-09 PROCEDURE — 99285 EMERGENCY DEPT VISIT HI MDM: CPT

## 2022-10-09 PROCEDURE — 6360000002 HC RX W HCPCS: Performed by: CLINICAL NURSE SPECIALIST

## 2022-10-09 PROCEDURE — 2580000003 HC RX 258: Performed by: NURSE PRACTITIONER

## 2022-10-09 PROCEDURE — 81003 URINALYSIS AUTO W/O SCOPE: CPT

## 2022-10-09 PROCEDURE — 96374 THER/PROPH/DIAG INJ IV PUSH: CPT

## 2022-10-09 PROCEDURE — 96375 TX/PRO/DX INJ NEW DRUG ADDON: CPT

## 2022-10-09 PROCEDURE — 2580000003 HC RX 258: Performed by: CLINICAL NURSE SPECIALIST

## 2022-10-09 PROCEDURE — 80053 COMPREHEN METABOLIC PANEL: CPT

## 2022-10-09 PROCEDURE — 6360000002 HC RX W HCPCS: Performed by: NURSE PRACTITIONER

## 2022-10-09 PROCEDURE — 1210000000 HC MED SURG R&B

## 2022-10-09 PROCEDURE — 85025 COMPLETE CBC W/AUTO DIFF WBC: CPT

## 2022-10-09 PROCEDURE — 83690 ASSAY OF LIPASE: CPT

## 2022-10-09 PROCEDURE — C9113 INJ PANTOPRAZOLE SODIUM, VIA: HCPCS | Performed by: CLINICAL NURSE SPECIALIST

## 2022-10-09 PROCEDURE — 83735 ASSAY OF MAGNESIUM: CPT

## 2022-10-09 PROCEDURE — A4216 STERILE WATER/SALINE, 10 ML: HCPCS | Performed by: CLINICAL NURSE SPECIALIST

## 2022-10-09 PROCEDURE — G0378 HOSPITAL OBSERVATION PER HR: HCPCS

## 2022-10-09 PROCEDURE — 36415 COLL VENOUS BLD VENIPUNCTURE: CPT

## 2022-10-09 RX ORDER — SPIRONOLACTONE AND HYDROCHLOROTHIAZIDE 25; 25 MG/1; MG/1
1 TABLET ORAL DAILY
Status: DISCONTINUED | OUTPATIENT
Start: 2022-10-10 | End: 2022-10-09

## 2022-10-09 RX ORDER — POTASSIUM CHLORIDE 7.45 MG/ML
10 INJECTION INTRAVENOUS PRN
Status: DISCONTINUED | OUTPATIENT
Start: 2022-10-09 | End: 2022-10-10 | Stop reason: HOSPADM

## 2022-10-09 RX ORDER — KETOROLAC TROMETHAMINE 15 MG/ML
15 INJECTION, SOLUTION INTRAMUSCULAR; INTRAVENOUS EVERY 6 HOURS PRN
Status: DISCONTINUED | OUTPATIENT
Start: 2022-10-09 | End: 2022-10-10 | Stop reason: HOSPADM

## 2022-10-09 RX ORDER — SODIUM CHLORIDE 9 MG/ML
INJECTION, SOLUTION INTRAVENOUS PRN
Status: DISCONTINUED | OUTPATIENT
Start: 2022-10-09 | End: 2022-10-10 | Stop reason: HOSPADM

## 2022-10-09 RX ORDER — ONDANSETRON 2 MG/ML
4 INJECTION INTRAMUSCULAR; INTRAVENOUS EVERY 6 HOURS PRN
Status: DISCONTINUED | OUTPATIENT
Start: 2022-10-09 | End: 2022-10-10 | Stop reason: HOSPADM

## 2022-10-09 RX ORDER — SODIUM CHLORIDE 0.9 % (FLUSH) 0.9 %
10 SYRINGE (ML) INJECTION PRN
Status: DISCONTINUED | OUTPATIENT
Start: 2022-10-09 | End: 2022-10-10 | Stop reason: HOSPADM

## 2022-10-09 RX ORDER — KETOROLAC TROMETHAMINE 15 MG/ML
15 INJECTION, SOLUTION INTRAMUSCULAR; INTRAVENOUS ONCE
Status: COMPLETED | OUTPATIENT
Start: 2022-10-09 | End: 2022-10-09

## 2022-10-09 RX ORDER — HYDROCHLOROTHIAZIDE 25 MG/1
25 TABLET ORAL DAILY
Status: DISCONTINUED | OUTPATIENT
Start: 2022-10-10 | End: 2022-10-10 | Stop reason: HOSPADM

## 2022-10-09 RX ORDER — 0.9 % SODIUM CHLORIDE 0.9 %
1000 INTRAVENOUS SOLUTION INTRAVENOUS ONCE
Status: COMPLETED | OUTPATIENT
Start: 2022-10-09 | End: 2022-10-09

## 2022-10-09 RX ORDER — CITALOPRAM 20 MG/1
20 TABLET ORAL DAILY
Status: DISCONTINUED | OUTPATIENT
Start: 2022-10-10 | End: 2022-10-10 | Stop reason: HOSPADM

## 2022-10-09 RX ORDER — MAGNESIUM SULFATE 1 G/100ML
1000 INJECTION INTRAVENOUS PRN
Status: DISCONTINUED | OUTPATIENT
Start: 2022-10-09 | End: 2022-10-10 | Stop reason: HOSPADM

## 2022-10-09 RX ORDER — POTASSIUM CHLORIDE 20 MEQ/1
40 TABLET, EXTENDED RELEASE ORAL PRN
Status: DISCONTINUED | OUTPATIENT
Start: 2022-10-09 | End: 2022-10-10 | Stop reason: HOSPADM

## 2022-10-09 RX ORDER — SODIUM CHLORIDE 9 MG/ML
INJECTION, SOLUTION INTRAVENOUS CONTINUOUS
Status: DISCONTINUED | OUTPATIENT
Start: 2022-10-09 | End: 2022-10-10

## 2022-10-09 RX ORDER — SPIRONOLACTONE 25 MG/1
25 TABLET ORAL DAILY
Status: DISCONTINUED | OUTPATIENT
Start: 2022-10-10 | End: 2022-10-10 | Stop reason: HOSPADM

## 2022-10-09 RX ORDER — ONDANSETRON 4 MG/1
4 TABLET, ORALLY DISINTEGRATING ORAL EVERY 8 HOURS PRN
Status: DISCONTINUED | OUTPATIENT
Start: 2022-10-09 | End: 2022-10-10 | Stop reason: HOSPADM

## 2022-10-09 RX ORDER — SODIUM CHLORIDE 0.9 % (FLUSH) 0.9 %
5-40 SYRINGE (ML) INJECTION EVERY 12 HOURS SCHEDULED
Status: DISCONTINUED | OUTPATIENT
Start: 2022-10-09 | End: 2022-10-10 | Stop reason: HOSPADM

## 2022-10-09 RX ADMIN — SODIUM CHLORIDE: 9 INJECTION, SOLUTION INTRAVENOUS at 22:08

## 2022-10-09 RX ADMIN — SODIUM CHLORIDE 40 MG: 9 INJECTION, SOLUTION INTRAMUSCULAR; INTRAVENOUS; SUBCUTANEOUS at 22:08

## 2022-10-09 RX ADMIN — KETOROLAC TROMETHAMINE 15 MG: 15 INJECTION, SOLUTION INTRAMUSCULAR; INTRAVENOUS at 18:35

## 2022-10-09 RX ADMIN — SODIUM CHLORIDE 1000 ML: 9 INJECTION, SOLUTION INTRAVENOUS at 18:33

## 2022-10-09 RX ADMIN — PIPERACILLIN AND TAZOBACTAM 4500 MG: 4; .5 INJECTION, POWDER, FOR SOLUTION INTRAVENOUS at 20:37

## 2022-10-09 ASSESSMENT — ENCOUNTER SYMPTOMS
DIARRHEA: 0
RECTAL PAIN: 0
ANAL BLEEDING: 0
BLOOD IN STOOL: 0
VOMITING: 0
CONSTIPATION: 0
NAUSEA: 0
EYES NEGATIVE: 1
ABDOMINAL DISTENTION: 0
ABDOMINAL PAIN: 1
RESPIRATORY NEGATIVE: 1

## 2022-10-09 ASSESSMENT — PAIN DESCRIPTION - ORIENTATION: ORIENTATION: RIGHT

## 2022-10-09 ASSESSMENT — PAIN SCALES - GENERAL
PAINLEVEL_OUTOF10: 6
PAINLEVEL_OUTOF10: 4

## 2022-10-09 ASSESSMENT — PAIN DESCRIPTION - PAIN TYPE: TYPE: ACUTE PAIN

## 2022-10-09 ASSESSMENT — PAIN DESCRIPTION - LOCATION: LOCATION: ABDOMEN

## 2022-10-09 NOTE — ED NOTES
Perfect served general surgery per [facility], SCARLET Donaldson, JOSSELIN  10/09/22 9846

## 2022-10-09 NOTE — CONSULTS
GENERAL SURGERY CONSULTATION- Inpatient- [place]                   Patient's Name/ Date of Birth/ Gender: Shamar Dennis / 1955 (75 y.o.) / female     PCP: Adrianne Oconnell DO  Referring: ER    History of present Illness:  Patient is a pleasant 75 y.o. female   presents to ER after experiencing symptoms of nausea, RLQ pain, loss of appetite, bloating. CT and labs reviewed. Past Medical History:  has a past medical history of Allergic rhinitis, GERD (gastroesophageal reflux disease), and Hyperlipidemia. Past Surgical History:   Past Surgical History:   Procedure Laterality Date    BLADDER REPAIR      BREAST SURGERY      lumpectomy    COLONOSCOPY      COLONOSCOPY N/A 08/24/2020    : COLONOSCOPY DIAGNOSTIC (N/A )     COLONOSCOPY N/A 08/24/2020    [facility]      LAPAROSCOPIC APPENDECTOMY  10/10/2022    APPENDECTOMY LAPAROSCOPIC ROBOTIC    LAPAROSCOPIC APPENDECTOMY N/A 10/10/2022    APPENDECTOMY LAPAROSCOPIC ROBOTIC performed by Willie Lauren DO at [address] History:  reports that she quit smoking about 34 years ago. Her smoking use included cigarettes. She has a 30.00 pack-year smoking history. She has never used smokeless tobacco. She reports that she does not drink alcohol and does not use drugs. Family History: family history includes Breast Cancer in her mother; Hypertension in her maternal aunt; Obesity in her brother, father, and mother.     Review of Systems:   General: Completed and, except as mentioned above, was negative or noncontributory  Psychological:  Completed and, except as mentioned above, was negative or noncontributory  Ophthalmic:  Completed and, except as mentioned above, was negative or noncontributory  ENT:  Completed and, except as mentioned above, was negative or noncontributory  Allergy and Immunology:  Completed and, except as mentioned above, was negative or noncontributory  Hematological and Lymphatic:  Completed and, except as mentioned above, was negative or noncontributory  Endocrine: Completed and, except as mentioned above, was negative or noncontributory  Breast:  Completed and, except as mentioned above, was negative or noncontributory  Respiratory:  Completed and, except as mentioned above, was negative or noncontributory  Cardiovascular:  Completed and, except as mentioned above, was negative or noncontributory  Gastrointestinal: Completed and, except as mentioned above, was negative or noncontributory  Genito-Urinary:  Completed and, except as mentioned above, was negative or noncontributory  Musculoskeletal:  Completed and, except as mentioned above, was negative or noncontributory  Neurological:  Completed and, except as mentioned above, was negative or noncontributory  Dermatological:  Completed and, except as mentioned above, was negative or noncontributory    Allergies: Tape [adhesive tape]    Current Meds:No current facility-administered medications for this encounter. Current Outpatient Medications:     HYDROcodone-acetaminophen (NORCO) 5-325 MG per tablet, Take 1 tablet by mouth every 4 hours as needed for Pain for up to 3 days. Intended supply: 3 days.  Take lowest dose possible to manage pain, Disp: 18 tablet, Rfl: 0    amoxicillin-clavulanate (AUGMENTIN) 875-125 MG per tablet, Take 1 tablet by mouth 2 times daily for 7 days, Disp: 14 tablet, Rfl: 0    ibuprofen (IBU) 600 MG tablet, Take 1 tablet by mouth every 8 hours as needed for Pain, Disp: 20 tablet, Rfl: 0    citalopram (CELEXA) 20 MG tablet, TAKE 1 TABLET BY MOUTH IN  THE MORNING, Disp: 90 tablet, Rfl: 3    spironolactone-hydroCHLOROthiazide (ALDACTAZIDE) 25-25 MG per tablet, TAKE 1 TABLET BY MOUTH  DAILY, Disp: 90 tablet, Rfl: 3    cholestyramine (QUESTRAN) 4 g packet, Take 1 packet by mouth daily, Disp: 90 packet, Rfl: 3    AL 0.1 MG/24HR, APPLY TWO TIMES WEEKLY AS DIRECTED, Disp: 8 patch, Rfl: 5    TRANSDERM-SCOP, 1.5 MG, TRANSDERMAL PATCH, APPLY 1 PATCH TOPICALLY EVERY 72 HOURS, Disp: 4 patch, Rfl: 0    fexofenadine-pseudoephedrine (ALLEGRA-D 24HR) 180-240 MG per extended release tablet, Take 1 tablet by mouth daily, Disp: 30 tablet, Rfl: 0    Ascorbic Acid (VITAMIN C PO), Take by mouth daily, Disp: , Rfl:     Ginkgo Biloba 120 MG CAPS, Take 120 mg by mouth daily, Disp: , Rfl:     valACYclovir (VALTREX) 500 MG tablet, TAKE 1 CAPLET BY MOUTH THREE TIMES DAILY, Disp: 90 tablet, Rfl: 0    Multiple Vitamins-Minerals (HM MULTIVITAMIN ADULT GUMMY PO), Take 1 each by mouth, Disp: , Rfl:     esomeprazole (NEXIUM) 20 MG delayed release capsule, TAKE 1 CAPSULE DAILY, Disp: 90 capsule, Rfl: 3    mometasone (NASONEX) 50 MCG/ACT nasal spray, 2 sprays by Nasal route daily as needed (nasal congestion), Disp: 1 Inhaler, Rfl: 6    UNABLE TO FIND, Take 1 capsule by mouth nightly. Progesterone DHEA 7 keto DHEA 75-10 SR, Disp: 90 mg, Rfl: 3    Physical Exam:  Vital signs and Nurse's note reviewed. /62   Pulse 89   Temp 98.2 °F (36.8 °C) (Oral)   Resp 16   Ht 5' 1\" (1.549 m)   Wt 174 lb 2.6 oz (79 kg)   LMP 07/25/1986 (Within Months)   SpO2 92%   BMI 32.91 kg/m²    height is 5' 1\" (1.549 m) and weight is 174 lb 2.6 oz (79 kg). Her oral temperature is 98.2 °F (36.8 °C). Her blood pressure is 108/62 and her pulse is 89. Her respiration is 16 and oxygen saturation is 92%. Gen:  A&Ox3, NAD. Pleasant and cooperative.   HEENT: PERRLA, EOMI, no scleral icterus  Neck:  no goiter  CVS: Regular rate and rhythm  Resp: Good bilateral air entry, no active wheezing, no labored breathing  Abd: soft, RLQ tenderness with reboung, guarding, + localized tenderness, no hernias, non-distended, bowel sounds present  Ext: Moves all extremities, no gross focal motor deficits  Skin: No erythema or ulcerations     Labs:   Lab Results   Component Value Date/Time    WBC 16.4 10/09/2022 06:26 PM    HGB 14.7 10/09/2022 06:26 PM    HCT 43.6 10/09/2022 06:26 PM    MCV 86.6 10/09/2022 06:26 PM     10/09/2022 06:26 PM     Lab Results   Component Value Date/Time     10/10/2022 06:26 AM    K 3.1 10/10/2022 06:26 AM     10/10/2022 06:26 AM    CO2 27 10/10/2022 06:26 AM    BUN 11 10/10/2022 06:26 AM    CREATININE 0.70 10/10/2022 06:26 AM    GLUCOSE 106 10/10/2022 06:26 AM    CALCIUM 8.0 10/10/2022 06:26 AM     Lab Results   Component Value Date/Time    ALKPHOS 62 10/09/2022 06:26 PM    ALT 10 10/09/2022 06:26 PM    AST 17 10/09/2022 06:26 PM    PROT 7.4 10/09/2022 06:26 PM    BILITOT 0.4 10/09/2022 06:26 PM    BILIDIR 0.09 04/02/2021 09:45 AM    LABALBU 4.1 10/09/2022 06:26 PM     No results found for: AMYLASE  Lab Results   Component Value Date/Time    LIPASE 26 10/09/2022 06:26 PM     Lab Results   Component Value Date/Time    INR 1.0 10/10/2022 06:26 AM       Radiologic Studies:  EXAMINATION:   CT OF THE ABDOMEN AND PELVIS WITHOUT CONTRAST 10/9/2022 3:54 pm       TECHNIQUE:   CT of the abdomen and pelvis was performed without the administration of   intravenous contrast. Multiplanar reformatted images are provided for review. Automated exposure control, iterative reconstruction, and/or weight based   adjustment of the mA/kV was utilized to reduce the radiation dose to as low   as reasonably achievable. COMPARISON:   06/26/2018       HISTORY:   ORDERING SYSTEM PROVIDED HISTORY: Q pain   TECHNOLOGIST PROVIDED HISTORY:   RLQ pain       Decision Support Exception - unselect if not a suspected or confirmed   emergency medical condition->Emergency Medical Condition (MA)   Reason for Exam: Right side abdomen pain       FINDINGS:   Lower Chest: Lung bases are clear. Organs: Liver is normal in size and density. No focal masses identified. No   evidence of intrahepatic ductal dilatation. Spleen is normal size. The   gallbladder is unremarkable.   Both adrenal glands are normal.  Pancreas is   normal in appearance. . The kidneys are  normal in size and attenuation   without evidence of hydronephrosis or renal calculi. GI/Bowel: The visualized bowel shows no mass lesions. Mild colonic   diverticulosis. No evidence of diverticulitis. Small hiatal hernia. Thickening of the appendiceal wall with surrounding inflammatory changes. Pelvis: No intrapelvic mass is identified. Bladder and rectum are intact. Peritoneum/Retroperitoneum: No free fluid. No lymphadenopathy. No evidence of   pneumoperitoneum. Bones/Soft Tissues: . The abdominal and pelvic walls are unremarkable. Degenerative changes seen in the visualized spine. Grade 1 spondylolisthesis   of L4 on L5. No acute bony abnormalities. Vascular calcifications are seen   compatible with atherosclerotic disease. Impression   Acute appendicitis       The findings were sent to the Radiology Results [address] at 7:46   pm on 10/9/2022 to be communicated to a licensed caregiver. RECOMMENDATIONS:   Surgical consultation           Impressions/Recommendations:     Acute appendicitis with localized peritonitis. NPO. IV antibiotisc broad spectrum. Informed consent for laparoscopic/robotic appendectomy obtained for surgery, risks and benefits discussed. Risks including bleeding, wound or intra-abdominal infection, injury to bowel/bladder/organs, need for conversion to open procedure, need for bowel resection, need for further procedures and sequelae of anesthesia were reviewed. Daughter Zackary Major at bedside. Thank you Riley Charles DO for allowing me to participate in the care of your patients.      Boubacar Estrada DO, MPH, [place] [address] Surgery, [address] [place] [address] office [phone]  [place] office [phone]

## 2022-10-09 NOTE — ED PROVIDER NOTES
[facility]  [address] [facility] [address]  Phone: [phone]        Pt Name: Alfie Ayala  MRN: [id number]  Augustus 1955  Date of evaluation: 10/9/22    [address] [facility]       Chief Complaint   Patient presents with    Abdominal Pain     Right lower quad since 0400 today       HISTORY OF PRESENT ILLNESS (Location/Symptom, Timing/Onset, Context/Setting, Quality, Duration, Modifying Factors, Severity)      lAfie Ayala is a 75 y.o. female with no pertinent PMH who presents to the ED via private auto with complaints of right lower quadrant pain since about 4 AM this morning. Patient states has been feeling off and on with some GI upset for the last 3 weeks or so. She states that this morning she was woken up out of her sleep with some pain in the right lower quadrant. She does report occasional nausea, but no vomiting. She denies any diarrhea, states her last bowel movement was this morning was normal for her. She does report some chills, but denies any known fever. She denies any chest pain or shortness of breath. Denies any headache dizziness or lightheadedness. She denies any nausea currently. She does have a history of hysterectomy. She denies any abnormal vaginal bleeding or discharge. She denies any dysuria or increase in urinary frequency/urgency. PAST MEDICAL / SURGICAL / SOCIAL / FAMILY HISTORY     PMH:  has a past medical history of Allergic rhinitis, GERD (gastroesophageal reflux disease), and Hyperlipidemia. Surgical History:  has a past surgical history that includes Tonsillectomy; Hysterectomy; bladder repair; Breast surgery; Colonoscopy; Colonoscopy (N/A, 08/24/2020); Colonoscopy (N/A, 08/24/2020); laparoscopic appendectomy (10/10/2022); and laparoscopic appendectomy (N/A, 10/10/2022). Social History:  reports that she quit smoking about 34 years ago. Her smoking use included cigarettes. She has a 30.00 pack-year smoking history.  She has never used smokeless tobacco. She reports that she does not drink alcohol and does not use drugs. Family History: She indicated that her mother is . She indicated that her father is . She indicated that her brother is alive. She indicated that the status of her maternal aunt is unknown.   family history includes Breast Cancer in her mother; Hypertension in her maternal aunt; Obesity in her brother, father, and mother. Psychiatric History: None    Allergies: Tape [adhesive tape]    Home Medications:   Prior to Admission medications    Medication Sig Start Date End Date Taking? Authorizing Provider   HYDROcodone-acetaminophen (NORCO) 5-325 MG per tablet Take 1 tablet by mouth every 4 hours as needed for Pain for up to 3 days. Intended supply: 3 days.  Take lowest dose possible to manage pain 10/10/22 10/13/22 Yes Bernardo Tovar DO   amoxicillin-clavulanate (AUGMENTIN) 875-125 MG per tablet Take 1 tablet by mouth 2 times daily for 7 days 10/10/22 10/17/22 Yes Bernardo Tovar DO   ibuprofen (IBU) 600 MG tablet Take 1 tablet by mouth every 8 hours as needed for Pain 22   Roland Arellano MD   citalopram (CELEXA) 20 MG tablet TAKE 1 TABLET BY MOUTH IN  THE MORNING 22   Hope Matute DO   spironolactone-hydroCHLOROthiazide (ALDACTAZIDE) 25-25 MG per tablet TAKE 1 TABLET BY MOUTH  DAILY 22  Hope Matute DO   cholestyramine Lesle Colander) 4 g packet Take 1 packet by mouth daily 6/10/22   Hope Matute DO   AL 0.1 MG/24HR APPLY TWO TIMES WEEKLY AS DIRECTED 22   Hope Matute DO   TRANSDERM-SCOP, 1.5 MG, TRANSDERMAL PATCH APPLY 1 PATCH TOPICALLY EVERY 72 HOURS 21   Sung Chu MD   fexofenadine-pseudoephedrine (ALLEGRA-D 24HR) 180-240 MG per extended release tablet Take 1 tablet by mouth daily 21   Hope Matute DO   Ascorbic Acid (VITAMIN C PO) Take by mouth daily    Historical Provider, MD   Ginkgo Biloba 120 MG CAPS Take 120 mg by mouth daily    Historical Provider, MD   valACYclovir (VALTREX) 500 MG tablet TAKE 1 CAPLET BY MOUTH THREE TIMES DAILY 12/30/20   Gailen Dark, DO   Multiple Vitamins-Minerals (HM MULTIVITAMIN ADULT GUMMY PO) Take 1 each by mouth    Historical Provider, MD   esomeprazole (NEXIUM) 20 MG delayed release capsule TAKE 1 CAPSULE DAILY 5/16/19   Gailen Dark, DO   mometasone (NASONEX) 50 MCG/ACT nasal spray 2 sprays by Nasal route daily as needed (nasal congestion) 5/16/19   Gailen Dark, DO   UNABLE TO FIND Take 1 capsule by mouth nightly. Progesterone DHEA 7 keto DHEA 75-10 SR 3/13/14   Gailen Dark, DO       REVIEW OF SYSTEMS  (2-9 systems for level 4, 10 ormore for level 5)      Review of Systems   Constitutional:  Positive for chills. Negative for activity change, appetite change, diaphoresis, fatigue, fever and unexpected weight change. HENT: Negative. Eyes: Negative. Respiratory: Negative. Cardiovascular: Negative. Gastrointestinal:  Positive for abdominal pain. Negative for abdominal distention, anal bleeding, blood in stool, constipation, diarrhea, nausea, rectal pain and vomiting. Right lower quadrant abdominal pain   Endocrine: Negative. Genitourinary: Negative. Musculoskeletal: Negative. Skin: Negative. Neurological: Negative. Hematological: Negative. Psychiatric/Behavioral: Negative. All other systems negative except as marked. PHYSICAL EXAM  (up to 7 for level 4, 8 or more for level 5)      INITIAL VITALS:  height is 5' 1\" (1.549 m) and weight is 79 kg (174 lb 2.6 oz). Her oral temperature is 98.2 °F (36.8 °C). Her blood pressure is 108/62 and her pulse is 89. Her respiration is 16 and oxygen saturation is 92%. Vital signs reviewed. Physical Exam  Constitutional:       Appearance: She is well-developed. HENT:      Head: Normocephalic. Mouth/Throat:      Pharynx: Oropharynx is clear.    Eyes: Extraocular Movements: Extraocular movements intact. Pupils: Pupils are equal, round, and reactive to light. Cardiovascular:      Rate and Rhythm: Normal rate and regular rhythm. Heart sounds: Normal heart sounds. Pulmonary:      Effort: Pulmonary effort is normal.      Breath sounds: Normal breath sounds. Abdominal:      General: Abdomen is flat. Bowel sounds are normal. There is no distension. Palpations: Abdomen is soft. There is no mass or pulsatile mass. Tenderness: abdominal tenderness in the right lower quadrant There is no right CVA tenderness, left CVA tenderness or guarding. Skin:     General: Skin is warm and dry. Capillary Refill: Capillary refill takes less than 2 seconds. Neurological:      General: No focal deficit present. Mental Status: She is alert. Psychiatric:         Mood and Affect: Mood normal.         Behavior: Behavior normal.         DIFFERENTIAL DIAGNOSIS / MDM     On exam, patient is resting in her room with her  at bedside. She appears nontoxic no distress is noted. Heart sounds are within normal limits upon auscultation. Bowel sounds are present in all 4 quadrants with auscultation. No abdominal distention is noted. She does have some tenderness in the right lower quadrant with palpation. She denies any nausea at this time. Order a CBC, CMP, lipase, urinalysis, CT of abdomen pelvis, Toradol and fluids. She agrees this plan of care. Reports a count elevated at 16.4.  UA is negative for urinary tract infection. Liver profile unremarkable. Potassium is low at 3.3. CT of the abdomen showing acute appendicitis. Patient signed out to the general surgery team and awaiting a callback. Patient is currently resting comfortably in her room she denies any pain at this time. I spoke with Dr. Michelle Nair recommended admittance to the hospital and IV Zosyn.   She states they will see her tomorrow and add her on for an appendectomy. Spoke with Jeniffer Sosa NP with [facility] who has agreed to accept the patient. Awaiting bed assignment. Patient resting comfortably in her room and agrees with plan of care. PLAN (LABS / IMAGING / EKG):  Orders Placed This Encounter   Procedures    CT ABDOMEN PELVIS WO CONTRAST Additional Contrast? None    CBC with Auto Differential    Comprehensive Metabolic Panel w/ Reflex to MG    Lipase    Urinalysis with Reflex to Culture    Magnesium    Basic Metabolic Panel w/ Reflex to MG    Protime-INR    Magnesium    Surgical Pathology    SURGICAL PATHOLOGY REPORT    Insert peripheral IV    ADMIT TO INPATIENT    ADMIT TO INPATIENT    Transfer Patient    Discharge patient       MEDICATIONS ORDERED:  Orders Placed This Encounter   Medications    ketorolac (TORADOL) injection 15 mg    0.9 % sodium chloride bolus    piperacillin-tazobactam (ZOSYN) 4,500 mg in dextrose 5 % 100 mL IVPB (mini-bag)     Order Specific Question:   Antimicrobial Indications     Answer: Other     Order Specific Question:   Other Abx Indication     Answer:   appendicitis    DISCONTD: piperacillin-tazobactam (ZOSYN) 3,375 mg in dextrose 5 % 50 mL IVPB (mini-bag)     1st dose in ER, please continue q8hrs thereafter     Order Specific Question:   Antimicrobial Indications     Answer:   Intra-Abdominal Infection    DISCONTD: ketorolac (TORADOL) injection 15 mg    DISCONTD: citalopram (CELEXA) tablet 20 mg    DISCONTD: pantoprazole (PROTONIX) 40 mg in sodium chloride (PF) 10 mL injection    DISCONTD: spironolactone-hydroCHLOROthiazide (ALDACTAZIDE) 25-25 MG per tablet 1 tablet     Order Specific Question:   Please select a reason the therapeutic interchange was not accepted:      Answer:   Reg Nesbitt for Pharmacy to Substitute with Components    DISCONTD: sodium chloride flush 0.9 % injection 5-40 mL    DISCONTD: sodium chloride flush 0.9 % injection 10 mL    DISCONTD: 0.9 % sodium chloride infusion    DISCONTD: potassium chloride (KLOR-CON M) extended release tablet 40 mEq    DISCONTD: potassium bicarb-citric acid (EFFER-K) effervescent tablet 40 mEq    DISCONTD: potassium chloride 10 mEq/100 mL IVPB (Peripheral Line)    DISCONTD: magnesium sulfate 1000 mg in dextrose 5% 100 mL IVPB    DISCONTD: ondansetron (ZOFRAN-ODT) disintegrating tablet 4 mg    DISCONTD: ondansetron (ZOFRAN) injection 4 mg    DISCONTD: 0.9 % sodium chloride infusion    DISCONTD: spironolactone (ALDACTONE) tablet 25 mg    DISCONTD: hydroCHLOROthiazide (HYDRODIURIL) tablet 25 mg    DISCONTD: sodium chloride flush 0.9 % injection 5-40 mL    DISCONTD: sodium chloride flush 0.9 % injection 5-40 mL    DISCONTD: 0.9 % sodium chloride infusion    DISCONTD: meperidine (DEMEROL) injection 12.5 mg    DISCONTD: morphine (PF) injection 1 mg    DISCONTD: HYDROmorphone (DILAUDID) injection 0.5 mg    DISCONTD: ondansetron (ZOFRAN) injection 4 mg    DISCONTD: diphenhydrAMINE (BENADRYL) injection 12.5 mg    bupivacaine (PF) (MARCAINE) 0.25 % injection     Crescencio Jaron.: cabinet override    fentaNYL (SUBLIMAZE) 100 MCG/2ML injection     Crescencio Jaron.: cabinet override    midazolam (VERSED) 2 MG/2ML injection     Crescencio Jaron.: cabinet override    DISCONTD: HYDROcodone-acetaminophen (NORCO) 5-325 MG per tablet 1 tablet    DISCONTD: HYDROcodone-acetaminophen (NORCO) 5-325 MG per tablet 2 tablet    HYDROcodone-acetaminophen (NORCO) 5-325 MG per tablet     Sig: Take 1 tablet by mouth every 4 hours as needed for Pain for up to 3 days. Intended supply: 3 days.  Take lowest dose possible to manage pain     Dispense:  18 tablet     Refill:  0     Reduce doses taken as pain becomes manageable    amoxicillin-clavulanate (AUGMENTIN) 875-125 MG per tablet     Sig: Take 1 tablet by mouth 2 times daily for 7 days     Dispense:  14 tablet     Refill:  0       Controlled Substances Monitoring:     DIAGNOSTIC RESULTS     EKG: All EKG's are interpreted by the Emergency Department Physician who either signs or Co-signs this chart in the absenceof a cardiologist.      RADIOLOGY: All images are read by the radiologist and their interpretations are reviewed. CT ABDOMEN PELVIS WO CONTRAST Additional Contrast? None   Final Result   Acute appendicitis      The findings were sent to the Radiology Results [address] at 7:46   pm on 10/9/2022 to be communicated to a licensed caregiver. RECOMMENDATIONS:   Surgical consultation             No results found.     LABS:  Results for orders placed or performed during the hospital encounter of 10/09/22   CBC with Auto Differential   Result Value Ref Range    WBC 16.4 (H) 3.5 - 11.0 k/uL    RBC 5.03 4.0 - 5.2 m/uL    Hemoglobin 14.7 12.0 - 16.0 g/dL    Hematocrit 43.6 36 - 46 %    MCV 86.6 80 - 100 fL    MCH 29.2 26 - 34 pg    MCHC 33.6 31 - 37 g/dL    RDW 12.9 12.5 - 15.4 %    Platelets 332 442 - 772 k/uL    MPV 9.1 6.0 - 12.0 fL    Seg Neutrophils 77 (H) 36 - 66 %    Lymphocytes 13 (L) 24 - 44 %    Monocytes 8 2 - 11 %    Eosinophils % 1 1 - 4 %    Basophils 1 0 - 2 %    Segs Absolute 12.80 (H) 1.8 - 7.7 k/uL    Absolute Lymph # 2.10 1.0 - 4.8 k/uL    Absolute Mono # 1.40 (H) 0.1 - 1.2 k/uL    Absolute Eos # 0.10 0.0 - 0.4 k/uL    Basophils Absolute 0.10 0.0 - 0.2 k/uL   Comprehensive Metabolic Panel w/ Reflex to MG   Result Value Ref Range    Glucose 107 (H) 70 - 99 mg/dL    BUN 10 8 - 23 mg/dL    Creatinine 0.66 0.50 - 0.90 mg/dL    Est, Glom Filt Rate >60 >60 mL/min/1.73m2    Calcium 9.2 8.6 - 10.4 mg/dL    Sodium 137 135 - 144 mmol/L    Potassium 3.3 (L) 3.7 - 5.3 mmol/L    Chloride 98 98 - 107 mmol/L    CO2 27 20 - 31 mmol/L    Anion Gap 12 9 - 17 mmol/L    Alkaline Phosphatase 62 35 - 104 U/L    ALT 10 5 - 33 U/L    AST 17 <32 U/L    Total Bilirubin 0.4 0.3 - 1.2 mg/dL    Total Protein 7.4 6.4 - 8.3 g/dL    Albumin 4.1 3.5 - 5.2 g/dL    Albumin/Globulin Ratio 1.2 1.0 - 2.5   Lipase   Result Value Ref Range    Lipase 26 13 - 60 U/L   Urinalysis with Reflex to Culture    Specimen: Urine, clean catch   Result Value Ref Range    Color, UA Yellow Yellow    Turbidity UA Clear Clear    Glucose, Ur NEGATIVE NEGATIVE    Bilirubin Urine NEGATIVE NEGATIVE    Ketones, Urine NEGATIVE NEGATIVE    Specific Gravity, UA 1.020 1.005 - 1.030    Urine Hgb NEGATIVE NEGATIVE    pH, UA 6.5 5.0 - 8.0    Protein, UA NEGATIVE NEGATIVE    Urobilinogen, Urine Normal Normal    Nitrite, Urine NEGATIVE NEGATIVE    Leukocyte Esterase, Urine NEGATIVE NEGATIVE    Urinalysis Comments       Microscopic exam not performed based on chemical results unless requested in original order. Urinalysis Comments          Urinalysis Comments       Utilizing a urinalysis as the only screening method to exclude a potential uropathogen can be unreliable in many patient populations. Rapid screening tests are less sensitive than culture and if UTI is a clinical possibility, culture should be considered despite a negative urinalysis. Magnesium   Result Value Ref Range    Magnesium 1.9 1.6 - 2.6 mg/dL   Basic Metabolic Panel w/ Reflex to MG   Result Value Ref Range    Glucose 106 (H) 70 - 99 mg/dL    BUN 11 8 - 23 mg/dL    Creatinine 0.70 0.50 - 0.90 mg/dL    Est, Glom Filt Rate >60 >60 mL/min/1.73m2    Calcium 8.0 (L) 8.6 - 10.4 mg/dL    Sodium 139 135 - 144 mmol/L    Potassium 3.1 (L) 3.7 - 5.3 mmol/L    Chloride 103 98 - 107 mmol/L    CO2 27 20 - 31 mmol/L    Anion Gap 9 9 - 17 mmol/L   Protime-INR   Result Value Ref Range    Protime 10.5 9.4 - 12.6 sec    INR 1.0    Magnesium   Result Value Ref Range    Magnesium 1.9 1.6 - 2.6 mg/dL   SURGICAL PATHOLOGY REPORT   Result Value Ref Range    Surgical Pathology Report       -- Diagnosis --  Vermiform appendix, laparoscopic appendectomy:  Acute appendicitis with acute serositis and fibrofatty obliteration of  the tip lumen.       Audra Gold  **Electronically Signed Out**         sf/10/11/2022       Clinical Information  Pre-op Diagnosis:  ACUTE APPENDICITIS, UNSPECIFIED TYPE   Operative Findings:  APPENDIX  Operation Performed:  LAPAROSCOPIC APPENDECTOMY     Source of Specimen  A: APPENDIX    Gross Description  \"SHAYLEE LUGO, APPENDIX\" Two portions of appendix, 2.2 and 6.1 cm in  length a 0.5 cm in diameter. There is a single staple line and there  is a large amount of attached mesoappendix. The serosa is pink-tan  with a patchy fibrinopurulent exudate. Sectioning reveals no  fecalith. Tip and cross sections with margin inked black 1cs. tm      Microscopic Description  1 H&E reviewed. Microscopic examination performed. SURGICAL PATHOLOGY CONSULTATION       Patient Name: Adryan Reynoso: [id number]  Path Number: [id number]     [facility] [address] [place], [address]  [phone]  Fax: [phone]         [address]:    Vitals:    10/10/22 1352 10/10/22 1528 10/10/22 1609 10/10/22 1637   BP: (!) 112/57   108/62   Pulse: 77   89   Resp: 16  16 16   Temp: 98.2 °F (36.8 °C)   98.2 °F (36.8 °C)   TempSrc: Oral   Oral   SpO2: 93% 95% 98% 92%   Weight:       Height:         -------------------------  BP: 108/62, Temp: 98.2 °F (36.8 °C), Heart Rate: 89, Resp: 16      RE-EVALUATION:  See ED Course notes above. CONSULTS:  None    PROCEDURES:  None    FINAL IMPRESSION      1.  Acute appendicitis, unspecified acute appendicitis type          DISPOSITION / PLAN     CONDITION ON DISPOSITION:       PATIENT REFERRED TO:  Edwardo Dueñas DO  [address]  [facility] [address]  [phone]    Follow up in 1 week(s)  For wound re-check    Miky Burciaga DO  [address] [place] [address]. Staffa Leopolda   [phone]    Follow up      DISCHARGE MEDICATIONS:  Discharge Medication List as of 10/10/2022  5:24 PM        START taking these medications    Details   HYDROcodone-acetaminophen (Jacob Justin) 5325 MG per tablet Take 1 tablet by mouth every 4 hours as needed for Pain for up to 3 days. Intended supply: 3 days.  Take lowest dose possible to manage pain, Disp-18 tablet, R-0Normal      amoxicillin-clavulanate (AUGMENTIN) 875-125 MG per tablet Take 1 tablet by mouth 2 times daily for 7 days, Disp-14 tablet, R-0Normal             PHILLIP Diaz NP   Emergency Medicine Nurse Practitioner    (Please note that portions of this note were completed with a voice recognition program.  Efforts were made to edit the dictations but occasionally words aremis-transcribed.)      PHILLIP Diaz NP  10/13/22 6987

## 2022-10-09 NOTE — ED PROVIDER NOTES
[id number] [facility]  [address] [facility] [address] [facility] [place] [address]  Phone: [phone]  Fax: [phone]      Attending Physician [address]       Chief Complaint   Patient presents with    Abdominal Pain     Right lower quad since 0400 today       DIAGNOSTIC RESULTS     LABS:  Labs Reviewed   CBC WITH AUTO DIFFERENTIAL   COMPREHENSIVE METABOLIC PANEL W/ REFLEX TO MG FOR LOW K   LIPASE   URINALYSIS WITH REFLEX TO CULTURE       All other labs were within normal range or not returned as of this dictation. RADIOLOGY:  CT ABDOMEN PELVIS WO CONTRAST Additional Contrast? None    (Results Pending)         EMERGENCY DEPARTMENT COURSE:   Vitals:    Vitals:    10/09/22 1757   BP: (!) 143/78   Pulse: (!) 106   Resp: 12   Temp: 99 °F (37.2 °C)   TempSrc: Oral   SpO2: 95%   Weight: 78.5 kg (173 lb)   Height: 5' 1\" (1.549 m)     -------------------------  BP: (!) 143/78, Temp: 99 °F (37.2 °C), Heart Rate: (!) 106, Resp: 12             PERTINENT ATTENDING PHYSICIAN COMMENTS:    I performed a history and physical examination of the patient and discussed management with the mid level provider. I reviewed the mid level provider's note and agree with the documented findings and plan of care. Any areas of disagreement are noted on the chart. I was personally present for the key portions of any procedures. I have documented in the chart those procedures where I was not present during the key portions. I have reviewed the emergency nurses triage note. I agree with the chief complaint, past medical history, past surgical history, allergies, medications, social and family history as documented unless otherwise noted below. Documentation of the HPI, Physical Exam and Medical Decision Making performed by mid level providers is based on my personal performance of the HPI, PE and MDM.  For Physician Assistant/ Nurse Practitioner cases/documentation I have personally evaluated this patient and have completed at least one if not all key elements of the E/M (history, physical exam, and MDM). Additional findings are as noted.       (Please note that portions of this note were completed with a voice recognition program.  Efforts were made to edit the dictations but occasionally words are mis-transcribed.)    Dhara Perez DO  Attending Emergency Medicine Physician       Dhara Perez DO  10/09/22 1717

## 2022-10-10 ENCOUNTER — ANESTHESIA EVENT (OUTPATIENT)
Dept: OPERATING ROOM | Age: 67
DRG: 343 | End: 2022-10-10
Payer: MEDICARE

## 2022-10-10 ENCOUNTER — ANESTHESIA (OUTPATIENT)
Dept: OPERATING ROOM | Age: 67
DRG: 343 | End: 2022-10-10
Payer: MEDICARE

## 2022-10-10 VITALS
HEIGHT: 61 IN | OXYGEN SATURATION: 92 % | BODY MASS INDEX: 32.88 KG/M2 | DIASTOLIC BLOOD PRESSURE: 62 MMHG | WEIGHT: 174.16 LBS | TEMPERATURE: 98.2 F | RESPIRATION RATE: 16 BRPM | SYSTOLIC BLOOD PRESSURE: 108 MMHG | HEART RATE: 89 BPM

## 2022-10-10 LAB
ANION GAP SERPL CALCULATED.3IONS-SCNC: 9 MMOL/L (ref 9–17)
BUN BLDV-MCNC: 11 MG/DL (ref 8–23)
CALCIUM SERPL-MCNC: 8 MG/DL (ref 8.6–10.4)
CHLORIDE BLD-SCNC: 103 MMOL/L (ref 98–107)
CO2: 27 MMOL/L (ref 20–31)
CREAT SERPL-MCNC: 0.7 MG/DL (ref 0.5–0.9)
GFR SERPL CREATININE-BSD FRML MDRD: >60 ML/MIN/1.73M2
GLUCOSE BLD-MCNC: 106 MG/DL (ref 70–99)
INR BLD: 1
MAGNESIUM: 1.9 MG/DL (ref 1.6–2.6)
POTASSIUM SERPL-SCNC: 3.1 MMOL/L (ref 3.7–5.3)
PROTHROMBIN TIME: 10.5 SEC (ref 9.4–12.6)
SODIUM BLD-SCNC: 139 MMOL/L (ref 135–144)

## 2022-10-10 PROCEDURE — 0DTJ4ZZ RESECTION OF APPENDIX, PERCUTANEOUS ENDOSCOPIC APPROACH: ICD-10-PCS | Performed by: SURGERY

## 2022-10-10 PROCEDURE — 80048 BASIC METABOLIC PNL TOTAL CA: CPT

## 2022-10-10 PROCEDURE — 3700000000 HC ANESTHESIA ATTENDED CARE: Performed by: SURGERY

## 2022-10-10 PROCEDURE — 85610 PROTHROMBIN TIME: CPT

## 2022-10-10 PROCEDURE — 2709999900 HC NON-CHARGEABLE SUPPLY: Performed by: SURGERY

## 2022-10-10 PROCEDURE — 88304 TISSUE EXAM BY PATHOLOGIST: CPT

## 2022-10-10 PROCEDURE — 7100000000 HC PACU RECOVERY - FIRST 15 MIN: Performed by: SURGERY

## 2022-10-10 PROCEDURE — 6360000002 HC RX W HCPCS

## 2022-10-10 PROCEDURE — C9113 INJ PANTOPRAZOLE SODIUM, VIA: HCPCS | Performed by: SURGERY

## 2022-10-10 PROCEDURE — A4216 STERILE WATER/SALINE, 10 ML: HCPCS | Performed by: SURGERY

## 2022-10-10 PROCEDURE — 8E0W4CZ ROBOTIC ASSISTED PROCEDURE OF TRUNK REGION, PERCUTANEOUS ENDOSCOPIC APPROACH: ICD-10-PCS | Performed by: SURGERY

## 2022-10-10 PROCEDURE — G0378 HOSPITAL OBSERVATION PER HR: HCPCS

## 2022-10-10 PROCEDURE — 6370000000 HC RX 637 (ALT 250 FOR IP): Performed by: HOSPITALIST

## 2022-10-10 PROCEDURE — 2500000003 HC RX 250 WO HCPCS: Performed by: NURSE ANESTHETIST, CERTIFIED REGISTERED

## 2022-10-10 PROCEDURE — 64488 TAP BLOCK BI INJECTION: CPT | Performed by: ANESTHESIOLOGY

## 2022-10-10 PROCEDURE — 2580000003 HC RX 258: Performed by: CLINICAL NURSE SPECIALIST

## 2022-10-10 PROCEDURE — 3600000019 HC SURGERY ROBOT ADDTL 15MIN: Performed by: SURGERY

## 2022-10-10 PROCEDURE — 99222 1ST HOSP IP/OBS MODERATE 55: CPT | Performed by: HOSPITALIST

## 2022-10-10 PROCEDURE — 2720000010 HC SURG SUPPLY STERILE: Performed by: SURGERY

## 2022-10-10 PROCEDURE — 7100000001 HC PACU RECOVERY - ADDTL 15 MIN: Performed by: SURGERY

## 2022-10-10 PROCEDURE — 6360000002 HC RX W HCPCS: Performed by: NURSE ANESTHETIST, CERTIFIED REGISTERED

## 2022-10-10 PROCEDURE — S2900 ROBOTIC SURGICAL SYSTEM: HCPCS | Performed by: SURGERY

## 2022-10-10 PROCEDURE — 2580000003 HC RX 258: Performed by: SURGERY

## 2022-10-10 PROCEDURE — 6360000002 HC RX W HCPCS: Performed by: SURGERY

## 2022-10-10 PROCEDURE — 96366 THER/PROPH/DIAG IV INF ADDON: CPT

## 2022-10-10 PROCEDURE — 6370000000 HC RX 637 (ALT 250 FOR IP): Performed by: CLINICAL NURSE SPECIALIST

## 2022-10-10 PROCEDURE — 2500000003 HC RX 250 WO HCPCS: Performed by: ANESTHESIOLOGY

## 2022-10-10 PROCEDURE — 6360000002 HC RX W HCPCS: Performed by: CLINICAL NURSE SPECIALIST

## 2022-10-10 PROCEDURE — 3700000001 HC ADD 15 MINUTES (ANESTHESIA): Performed by: SURGERY

## 2022-10-10 PROCEDURE — 36415 COLL VENOUS BLD VENIPUNCTURE: CPT

## 2022-10-10 PROCEDURE — 3600000009 HC SURGERY ROBOT BASE: Performed by: SURGERY

## 2022-10-10 PROCEDURE — 83735 ASSAY OF MAGNESIUM: CPT

## 2022-10-10 PROCEDURE — 2580000003 HC RX 258: Performed by: NURSE ANESTHETIST, CERTIFIED REGISTERED

## 2022-10-10 RX ORDER — FENTANYL CITRATE 50 UG/ML
INJECTION, SOLUTION INTRAMUSCULAR; INTRAVENOUS
Status: COMPLETED
Start: 2022-10-10 | End: 2022-10-10

## 2022-10-10 RX ORDER — MORPHINE SULFATE 2 MG/ML
1 INJECTION, SOLUTION INTRAMUSCULAR; INTRAVENOUS EVERY 5 MIN PRN
Status: DISCONTINUED | OUTPATIENT
Start: 2022-10-10 | End: 2022-10-10 | Stop reason: HOSPADM

## 2022-10-10 RX ORDER — DEXAMETHASONE SODIUM PHOSPHATE 10 MG/ML
INJECTION, SOLUTION INTRAMUSCULAR; INTRAVENOUS PRN
Status: DISCONTINUED | OUTPATIENT
Start: 2022-10-10 | End: 2022-10-10 | Stop reason: SDUPTHER

## 2022-10-10 RX ORDER — ROCURONIUM BROMIDE 10 MG/ML
INJECTION, SOLUTION INTRAVENOUS PRN
Status: DISCONTINUED | OUTPATIENT
Start: 2022-10-10 | End: 2022-10-10 | Stop reason: SDUPTHER

## 2022-10-10 RX ORDER — FENTANYL CITRATE 50 UG/ML
INJECTION, SOLUTION INTRAMUSCULAR; INTRAVENOUS PRN
Status: DISCONTINUED | OUTPATIENT
Start: 2022-10-10 | End: 2022-10-10 | Stop reason: SDUPTHER

## 2022-10-10 RX ORDER — GLYCOPYRROLATE 1 MG/5 ML
SYRINGE (ML) INTRAVENOUS PRN
Status: DISCONTINUED | OUTPATIENT
Start: 2022-10-10 | End: 2022-10-10 | Stop reason: SDUPTHER

## 2022-10-10 RX ORDER — HYDROCODONE BITARTRATE AND ACETAMINOPHEN 5; 325 MG/1; MG/1
1 TABLET ORAL EVERY 4 HOURS PRN
Status: DISCONTINUED | OUTPATIENT
Start: 2022-10-10 | End: 2022-10-10 | Stop reason: HOSPADM

## 2022-10-10 RX ORDER — KETOROLAC TROMETHAMINE 30 MG/ML
INJECTION, SOLUTION INTRAMUSCULAR; INTRAVENOUS PRN
Status: DISCONTINUED | OUTPATIENT
Start: 2022-10-10 | End: 2022-10-10 | Stop reason: SDUPTHER

## 2022-10-10 RX ORDER — LIDOCAINE HYDROCHLORIDE 10 MG/ML
INJECTION, SOLUTION INFILTRATION; PERINEURAL PRN
Status: DISCONTINUED | OUTPATIENT
Start: 2022-10-10 | End: 2022-10-10 | Stop reason: SDUPTHER

## 2022-10-10 RX ORDER — ONDANSETRON 2 MG/ML
INJECTION INTRAMUSCULAR; INTRAVENOUS PRN
Status: DISCONTINUED | OUTPATIENT
Start: 2022-10-10 | End: 2022-10-10 | Stop reason: SDUPTHER

## 2022-10-10 RX ORDER — AMOXICILLIN AND CLAVULANATE POTASSIUM 875; 125 MG/1; MG/1
1 TABLET, FILM COATED ORAL 2 TIMES DAILY
Qty: 14 TABLET | Refills: 0 | Status: SHIPPED | OUTPATIENT
Start: 2022-10-10 | End: 2022-10-17

## 2022-10-10 RX ORDER — HYDROCODONE BITARTRATE AND ACETAMINOPHEN 5; 325 MG/1; MG/1
1 TABLET ORAL EVERY 4 HOURS PRN
Qty: 18 TABLET | Refills: 0 | Status: SHIPPED | OUTPATIENT
Start: 2022-10-10 | End: 2022-10-13

## 2022-10-10 RX ORDER — MIDAZOLAM HYDROCHLORIDE 1 MG/ML
INJECTION INTRAMUSCULAR; INTRAVENOUS
Status: COMPLETED
Start: 2022-10-10 | End: 2022-10-10

## 2022-10-10 RX ORDER — SODIUM CHLORIDE 0.9 % (FLUSH) 0.9 %
5-40 SYRINGE (ML) INJECTION PRN
Status: DISCONTINUED | OUTPATIENT
Start: 2022-10-10 | End: 2022-10-10 | Stop reason: HOSPADM

## 2022-10-10 RX ORDER — HYDROCODONE BITARTRATE AND ACETAMINOPHEN 5; 325 MG/1; MG/1
2 TABLET ORAL EVERY 4 HOURS PRN
Status: DISCONTINUED | OUTPATIENT
Start: 2022-10-10 | End: 2022-10-10 | Stop reason: HOSPADM

## 2022-10-10 RX ORDER — NEOSTIGMINE METHYLSULFATE 5 MG/5 ML
SYRINGE (ML) INTRAVENOUS PRN
Status: DISCONTINUED | OUTPATIENT
Start: 2022-10-10 | End: 2022-10-10 | Stop reason: SDUPTHER

## 2022-10-10 RX ORDER — BUPIVACAINE HYDROCHLORIDE 2.5 MG/ML
INJECTION, SOLUTION EPIDURAL; INFILTRATION; INTRACAUDAL
Status: COMPLETED
Start: 2022-10-10 | End: 2022-10-10

## 2022-10-10 RX ORDER — PROPOFOL 10 MG/ML
INJECTION, EMULSION INTRAVENOUS PRN
Status: DISCONTINUED | OUTPATIENT
Start: 2022-10-10 | End: 2022-10-10 | Stop reason: SDUPTHER

## 2022-10-10 RX ORDER — BUPIVACAINE HYDROCHLORIDE 2.5 MG/ML
INJECTION, SOLUTION EPIDURAL; INFILTRATION; INTRACAUDAL
Status: COMPLETED | OUTPATIENT
Start: 2022-10-10 | End: 2022-10-10

## 2022-10-10 RX ORDER — ONDANSETRON 2 MG/ML
4 INJECTION INTRAMUSCULAR; INTRAVENOUS
Status: DISCONTINUED | OUTPATIENT
Start: 2022-10-10 | End: 2022-10-10 | Stop reason: HOSPADM

## 2022-10-10 RX ORDER — MEPERIDINE HYDROCHLORIDE 50 MG/ML
12.5 INJECTION INTRAMUSCULAR; INTRAVENOUS; SUBCUTANEOUS ONCE
Status: DISCONTINUED | OUTPATIENT
Start: 2022-10-10 | End: 2022-10-10 | Stop reason: HOSPADM

## 2022-10-10 RX ORDER — SODIUM CHLORIDE, SODIUM LACTATE, POTASSIUM CHLORIDE, CALCIUM CHLORIDE 600; 310; 30; 20 MG/100ML; MG/100ML; MG/100ML; MG/100ML
INJECTION, SOLUTION INTRAVENOUS CONTINUOUS PRN
Status: DISCONTINUED | OUTPATIENT
Start: 2022-10-10 | End: 2022-10-10 | Stop reason: SDUPTHER

## 2022-10-10 RX ORDER — DIPHENHYDRAMINE HYDROCHLORIDE 50 MG/ML
12.5 INJECTION INTRAMUSCULAR; INTRAVENOUS
Status: DISCONTINUED | OUTPATIENT
Start: 2022-10-10 | End: 2022-10-10 | Stop reason: HOSPADM

## 2022-10-10 RX ORDER — SODIUM CHLORIDE 0.9 % (FLUSH) 0.9 %
5-40 SYRINGE (ML) INJECTION EVERY 12 HOURS SCHEDULED
Status: DISCONTINUED | OUTPATIENT
Start: 2022-10-10 | End: 2022-10-10 | Stop reason: HOSPADM

## 2022-10-10 RX ORDER — SODIUM CHLORIDE 9 MG/ML
25 INJECTION, SOLUTION INTRAVENOUS PRN
Status: DISCONTINUED | OUTPATIENT
Start: 2022-10-10 | End: 2022-10-10 | Stop reason: HOSPADM

## 2022-10-10 RX ADMIN — BUPIVACAINE HYDROCHLORIDE 50 ML: 2.5 INJECTION, SOLUTION EPIDURAL; INFILTRATION; INTRACAUDAL; PERINEURAL at 10:48

## 2022-10-10 RX ADMIN — SODIUM CHLORIDE, PRESERVATIVE FREE 10 ML: 5 INJECTION INTRAVENOUS at 16:13

## 2022-10-10 RX ADMIN — KETOROLAC TROMETHAMINE 15 MG: 30 INJECTION, SOLUTION INTRAMUSCULAR; INTRAVENOUS at 11:53

## 2022-10-10 RX ADMIN — FENTANYL CITRATE 100 MCG: 50 INJECTION, SOLUTION INTRAMUSCULAR; INTRAVENOUS at 10:45

## 2022-10-10 RX ADMIN — SODIUM CHLORIDE, POTASSIUM CHLORIDE, SODIUM LACTATE AND CALCIUM CHLORIDE: 600; 310; 30; 20 INJECTION, SOLUTION INTRAVENOUS at 11:00

## 2022-10-10 RX ADMIN — SODIUM CHLORIDE 40 MG: 9 INJECTION, SOLUTION INTRAMUSCULAR; INTRAVENOUS; SUBCUTANEOUS at 16:09

## 2022-10-10 RX ADMIN — Medication 0.6 MG: at 11:50

## 2022-10-10 RX ADMIN — ONDANSETRON 4 MG: 2 INJECTION INTRAMUSCULAR; INTRAVENOUS at 11:43

## 2022-10-10 RX ADMIN — PIPERACILLIN AND TAZOBACTAM 3.38 G: 3; .375 INJECTION, POWDER, FOR SOLUTION INTRAVENOUS at 11:11

## 2022-10-10 RX ADMIN — SODIUM CHLORIDE, PRESERVATIVE FREE 10 ML: 5 INJECTION INTRAVENOUS at 10:07

## 2022-10-10 RX ADMIN — Medication 3 MG: at 11:50

## 2022-10-10 RX ADMIN — PIPERACILLIN AND TAZOBACTAM 3375 MG: 3; .375 INJECTION, POWDER, FOR SOLUTION INTRAVENOUS at 02:34

## 2022-10-10 RX ADMIN — ROCURONIUM BROMIDE 40 MG: 10 INJECTION, SOLUTION INTRAVENOUS at 11:00

## 2022-10-10 RX ADMIN — MIDAZOLAM HYDROCHLORIDE 2 MG: 1 INJECTION, SOLUTION INTRAMUSCULAR; INTRAVENOUS at 10:44

## 2022-10-10 RX ADMIN — HYDROCODONE BITARTRATE AND ACETAMINOPHEN 1 TABLET: 5; 325 TABLET ORAL at 16:09

## 2022-10-10 RX ADMIN — POTASSIUM CHLORIDE 40 MEQ: 1500 TABLET, EXTENDED RELEASE ORAL at 08:37

## 2022-10-10 RX ADMIN — DEXAMETHASONE SODIUM PHOSPHATE 5 MG: 10 INJECTION INTRAMUSCULAR; INTRAVENOUS at 11:08

## 2022-10-10 RX ADMIN — LIDOCAINE HYDROCHLORIDE 20 MG: 10 INJECTION, SOLUTION INFILTRATION; PERINEURAL at 11:00

## 2022-10-10 RX ADMIN — FENTANYL CITRATE 100 MCG: 50 INJECTION, SOLUTION INTRAMUSCULAR; INTRAVENOUS at 11:00

## 2022-10-10 RX ADMIN — KETOROLAC TROMETHAMINE 15 MG: 15 INJECTION, SOLUTION INTRAMUSCULAR; INTRAVENOUS at 03:54

## 2022-10-10 RX ADMIN — PROPOFOL 150 MG: 10 INJECTION, EMULSION INTRAVENOUS at 11:00

## 2022-10-10 ASSESSMENT — PAIN DESCRIPTION - LOCATION
LOCATION: ABDOMEN

## 2022-10-10 ASSESSMENT — PAIN SCALES - GENERAL
PAINLEVEL_OUTOF10: 4
PAINLEVEL_OUTOF10: 3
PAINLEVEL_OUTOF10: 0
PAINLEVEL_OUTOF10: 4
PAINLEVEL_OUTOF10: 2

## 2022-10-10 ASSESSMENT — PAIN DESCRIPTION - ORIENTATION
ORIENTATION: RIGHT;LOWER
ORIENTATION: LOWER;RIGHT

## 2022-10-10 ASSESSMENT — PAIN DESCRIPTION - DESCRIPTORS: DESCRIPTORS: SHARP;SHOOTING

## 2022-10-10 NOTE — BRIEF OP NOTE
Brief Postoperative Note      Patient: Sylvie Zaragoza  YOB: 1955  MRN: [id number]  Galo Schaefer DO      Date of Procedure: 10/10/2022    Pre-Op Diagnosis: Acute appendicitis, suppurative    Post-Op Diagnosis: Same       Procedure(s):  APPENDECTOMY LAPAROSCOPIC ROBOTIC    Surgeon(s):  Marianne Mello DO    Assistant:  Resident: Eliz Husain DO    Anesthesia: General + US guided TAP block    Estimated Blood Loss (mL): 10 ml    Complications: None    Specimens:   ID Type Source Tests Collected by Time Destination   A : APPENDIX Tissue Appendix [id number] [facility] 10/10/2022 1142        Counts: correct    Electronically signed by Marianne Mello DO, FACOS, FACS on 10/10/2022 at 12:03 PM

## 2022-10-10 NOTE — ED NOTES
Burke Rao from intermed call and speaks with [facility] regarding admission.       Galdino Rainey RN  10/09/22 2022

## 2022-10-10 NOTE — DISCHARGE SUMMARY
[facility]  Office: [address], DO, Archana Qasim, DO, Jose J Devlinul, DO, Boby Waldronpau Roberts, DO, Robert Zavaleta MD, Jayna Arias MD, Racquel Wise MD, Rubin Polk MD,  Rossi Carvajal MD, Bonifacio Copeland MD, Benita Baltazar, DO, Nilsa Rodriguez MD,  Landry Ramos MD, Zhang Allen MD, Petros Orona DO, Megan Hoffman MD, Reinaldo Ho MD, Patrick Morataya MD, Valdemar Morales MD, Pravin Mack MD, Moon Nye MD, Orlando Montiel DO, Dana Kuhn MD, Mynor Bryant MD, Romayne Muscat, CNP,  Mary Herrera, CNP, Axel Rowland, CNP, Pete Rojas, CNP,  Alberto Lopez, DNP, Demetris Ruiz, CNP, Enrrique Ramirez, CNP, Adrián Delgado, CNP, Hoda Babb, CNP, Edda Bhakta, CNP, Marina Solano PA-C, Bao Naidu, [facility], Parul Lynn, [facility], Kerry Hess, [facility], Lolis Lamas, [facility], Lacey Beck [address]    Discharge Summary     Patient ID: Rosemarie Segundo  :  1955   MRN: [id number]     ACCOUNT:  [de-identified]   Patient's PCP: Mariza Garber DO  Admit Date: 10/9/2022   Discharge Date: 10/10/2022     Length of Stay: 1  Code Status:  Full Code  Admitting Physician: Vanessa Norris DO  Discharge Physician: Vanessa Norris DO     Active Discharge Diagnoses:     Hospital Problem Lists:  Principal Problem:    Acute appendicitis  Resolved Problems:    * No resolved hospital problems. *      Admission Condition:  fair     Discharged Condition: good    Hospital Stay:     Hospital Course:  Jamal Dougherty is a 75 y.o. female who was admitted for the management of  Acute appendicitis , presented to ER with Abdominal Pain (Right lower quad since 0400 today)    This very pleasant 70-year-old female presented hospital with abdominal pain. Imaging studies revealed an acute appendicitis. The patient underwent robotic assisted laparoscopic appendectomy.   She had an uneventful postoperative course and ultimately is discharged home in stable condition. Significant therapeutic interventions: As above    Significant Diagnostic Studies:   Labs / Micro:  CBC:   Lab Results   Component Value Date/Time    WBC 16.4 10/09/2022 06:26 PM    RBC 5.03 10/09/2022 06:26 PM    HGB 14.7 10/09/2022 06:26 PM    HCT 43.6 10/09/2022 06:26 PM    MCV 86.6 10/09/2022 06:26 PM    MCH 29.2 10/09/2022 06:26 PM    MCHC 33.6 10/09/2022 06:26 PM    RDW 12.9 10/09/2022 06:26 PM     10/09/2022 06:26 PM     BMP:    Lab Results   Component Value Date/Time    GLUCOSE 106 10/10/2022 06:26 AM     10/10/2022 06:26 AM    K 3.1 10/10/2022 06:26 AM     10/10/2022 06:26 AM    CO2 27 10/10/2022 06:26 AM    ANIONGAP 9 10/10/2022 06:26 AM    BUN 11 10/10/2022 06:26 AM    CREATININE 0.70 10/10/2022 06:26 AM    BUNCRER NOT REPORTED 04/02/2021 09:45 AM    CALCIUM 8.0 10/10/2022 06:26 AM    LABGLOM >60 10/10/2022 06:26 AM    GFRAA >60 04/02/2021 09:45 AM    GFR      04/02/2021 09:45 AM    GFR NOT REPORTED 04/02/2021 09:45 AM        Radiology:  CT ABDOMEN PELVIS WO CONTRAST Additional Contrast? None    Result Date: 10/9/2022  Acute appendicitis The findings were sent to the Radiology Results [address] at 7:46 pm on 10/9/2022 to be communicated to a licensed caregiver. RECOMMENDATIONS: Surgical consultation       Consultations:    Consults:     Final Specialist Recommendations/Findings:   None      The patient was seen and examined on day of discharge and this discharge summary is in conjunction with any daily progress note from day of discharge.     Discharge plan:     Disposition: Home    Physician Follow Up:     Brissa Lemons DO  [address] [facility] [address] [phone]    Follow up in 1 week(s)  For wound re-check    Hope Matute, [address]  [phone]    Follow up         Requiring Further Evaluation/Follow Up POST HOSPITALIZATION/Incidental Findings: None    Diet: regular diet    Activity: As tolerated    Instructions to Patient: None    Discharge Medications:      Medication List        START taking these medications      amoxicillin-clavulanate 875-125 MG per tablet  Commonly known as: AUGMENTIN  Take 1 tablet by mouth 2 times daily for 7 days     HYDROcodone-acetaminophen 5-325 MG per tablet  Commonly known as: Norco  Take 1 tablet by mouth every 4 hours as needed for Pain for up to 3 days. Intended supply: 3 days. Take lowest dose possible to manage pain            CONTINUE taking these medications      cholestyramine 4 g packet  Commonly known as: Questran  Take 1 packet by mouth daily     citalopram 20 MG tablet  Commonly known as: CELEXA  TAKE 1 TABLET BY MOUTH IN  THE MORNING     Shavonne 0.1 MG/24HR  Generic drug: estradiol  APPLY TWO TIMES WEEKLY AS DIRECTED     esomeprazole 20 MG delayed release capsule  Commonly known as: NexIUM  TAKE 1 CAPSULE DAILY     fexofenadine-pseudoephedrine 180-240 MG per extended release tablet  Commonly known as: ALLEGRA-D 24HR  Take 1 tablet by mouth daily     Ginkgo Biloba 120 MG Caps     HM MULTIVITAMIN ADULT GUMMY PO     ibuprofen 600 MG tablet  Commonly known as: IBU  Take 1 tablet by mouth every 8 hours as needed for Pain     mometasone 50 MCG/ACT nasal spray  Commonly known as: NASONEX  2 sprays by Nasal route daily as needed (nasal congestion)     spironolactone-hydroCHLOROthiazide 25-25 MG per tablet  Commonly known as: ALDACTAZIDE  TAKE 1 TABLET BY MOUTH  DAILY     Transderm-Scop (1.5 MG) transdermal patch  Generic drug: scopolamine  APPLY 1 PATCH TOPICALLY EVERY 72 HOURS     UNABLE TO FIND  Take 1 capsule by mouth nightly.  Progesterone DHEA 7 keto DHEA 75-10 SR     valACYclovir 500 MG tablet  Commonly known as: VALTREX  TAKE 1 CAPLET BY MOUTH THREE TIMES DAILY     VITAMIN C PO               Where to Get Your Medications        These medications were sent to [place] [address] [address]  [facility]       Phone: [phone]   amoxicillin-clavulanate 875-125 MG per tablet  HYDROcodone-acetaminophen 5-325 MG per tablet         No discharge procedures on file. Time Spent on discharge is  17 mins in patient examination, evaluation, counseling as well as medication reconciliation, prescriptions for required medications, discharge plan and follow up. Electronically signed by   Jarret Garcia DO  10/10/2022  5:16 PM      Thank you Dr. Misbah Gusman DO for the opportunity to be involved in this patient's care.

## 2022-10-10 NOTE — PLAN OF CARE
Problem: Discharge Planning  Goal: Discharge to home or other facility with appropriate resources  Outcome: Progressing  Flowsheets (Taken 10/9/2022 2149)  Discharge to home or other facility with appropriate resources:   Identify barriers to discharge with patient and caregiver   Identify discharge learning needs (meds, wound care, etc)   Refer to discharge planning if patient needs post-hospital services based on physician order or complex needs related to functional status, cognitive ability or social support system   Arrange for needed discharge resources and transportation as appropriate   Patient actively participates in ADLs and decision making regarding plan of care   Problem: Pain  Goal: Verbalizes/displays adequate comfort level or baseline comfort level  Outcome: Progressing   No new signs/symptoms of pain noted, pain rating < 3 on scale 0-10, pain controlled with medication/ repositioning

## 2022-10-10 NOTE — PLAN OF CARE
Problem: Discharge Planning  Goal: Discharge to home or other facility with appropriate resources  10/10/2022 1743 by Aldo Lindsey RN  Outcome: Completed  10/10/2022 1009 by Patty Llamas RN  Outcome: Progressing     Problem: Pain  Goal: Verbalizes/displays adequate comfort level or baseline comfort level  10/10/2022 1743 by Aldo Lindsey RN  Outcome: Completed  Flowsheets (Taken 10/10/2022 1637)  Verbalizes/displays adequate comfort level or baseline comfort level:   Encourage patient to monitor pain and request assistance   Assess pain using appropriate pain scale  10/10/2022 1009 by Patty Llamas RN  Outcome: Glenny De La Torre (Taken 10/10/2022 0744 by Aldo Lindsey RN)  Verbalizes/displays adequate comfort level or baseline comfort level:   Encourage patient to monitor pain and request assistance   Assess pain using appropriate pain scale     Problem: ABCDS Injury Assessment  Goal: Absence of physical injury  10/10/2022 1743 by Aldo Lindsey RN  Outcome: Completed  10/10/2022 1009 by Patty Llamas RN  Outcome: Progressing

## 2022-10-10 NOTE — DISCHARGE INSTRUCTIONS
Ok to walk around as much as you want and climb stairs. No lifting more than 15-20 pounds x 2 weeks    Ok to shower in 24 hours. Ice pack to incisions 2-3 times a day for 10-15 minutes at a time AS NEEDED - your incisions will be bruised and swollen and feel \"lumpy\" and hard- especially at the belly button- this is  normal for about 2-3 weeks. No soaking in a tub bath or pool. No driving for at least 4-5 days, or if still needing to take pain medications. Do not remove steristrips they stay on for 2 weeks. Ok to shower with them on    Ok to lay on your belly, or sides, any position is fine. Expect some blood tinged drainage from incisions for a day or so. You may feel upper shoulder/back pain from the residual air in the belly from surgery. The more you walk around and take some deep breaths, the faster it will be absorbed. Take tylenol or motrin for milder pain. MUST avoid constipation. Take a stool softener as needed. If no bowel movement in 2-3 days, take a whole glass bottle of Magnesium Citrate over the counter, take as directed. Ok to take other laxative such as Miralax if you want or need. Call office or go to ER if fevers over 101, severe shortness of breath, uncontrolled nausea or vomiting.

## 2022-10-10 NOTE — ANESTHESIA PRE PROCEDURE
Department of Anesthesiology  Preprocedure Note       Name:  Duyen Espino   Age:  75 y.o.  :  1955                                          MRN:  [id number]         Date:  10/10/2022      Surgeon: Tessa Lopez):  Gabriella El DO    Procedure: Procedure(s):  APPENDECTOMY LAPAROSCOPIC ROBOTIC    Medications prior to admission:   Prior to Admission medications    Medication Sig Start Date End Date Taking? Authorizing Provider   ibuprofen (IBU) 600 MG tablet Take 1 tablet by mouth every 8 hours as needed for Pain 22   Arash Gee MD   citalopram (CELEXA) 20 MG tablet TAKE 1 TABLET BY MOUTH IN  THE MORNING 22   Roosevelt Fragmin, DO   spironolactone-hydroCHLOROthiazide (ALDACTAZIDE) 25-25 MG per tablet TAKE 1 TABLET BY MOUTH  DAILY 22  Roosevelt Fragmin, DO   cholestyramine Sam Spitz) 4 g packet Take 1 packet by mouth daily 6/10/22   Roosevelt Fragmin, DO   AL 0.1 MG/24HR APPLY TWO TIMES WEEKLY AS DIRECTED 22   [facility], DO   TRANSDERM-SCOP, 1.5 MG, TRANSDERMAL PATCH APPLY 1 PATCH TOPICALLY EVERY 72 HOURS 21   Sung Watkins MD   fexofenadine-pseudoephedrine (ALLEGRA-D 24HR) 180-240 MG per extended release tablet Take 1 tablet by mouth daily 21   Roosevelt Fragmin, DO   Ascorbic Acid (VITAMIN C PO) Take by mouth daily    Historical Provider, MD   Ginkgo Biloba 120 MG CAPS Take 120 mg by mouth daily    Historical Provider, MD   valACYclovir (VALTREX) 500 MG tablet TAKE 1 CAPLET BY MOUTH THREE TIMES DAILY 20   Roosevelt Fragmin, DO   Multiple Vitamins-Minerals (HM MULTIVITAMIN ADULT GUMMY PO) Take 1 each by mouth    Historical Provider, MD   esomeprazole (NEXIUM) 20 MG delayed release capsule TAKE 1 CAPSULE DAILY 19   Roosevelt Fragmin, DO   mometasone (NASONEX) 50 MCG/ACT nasal spray 2 sprays by Nasal route daily as needed (nasal congestion) 19   [facility], DO   UNABLE TO FIND Take 1 capsule by mouth nightly. Progesterone DHEA 7 keto DHEA 75-10 SR 3/13/14   Roosevelt Barnett,        Current medications:    Current Facility-Administered Medications   Medication Dose Route Frequency Provider Last Rate Last Admin    piperacillin-tazobactam (ZOSYN) 3,375 mg in dextrose 5 % 50 mL IVPB (mini-bag)  3,375 mg IntraVENous Q8H Radha Marines, APRN - CNS   Stopped at 10/10/22 0700    ketorolac (TORADOL) injection 15 mg  15 mg IntraVENous Q6H PRN Radha Marines, APRN - CNS   15 mg at 10/10/22 0354    citalopram (CELEXA) tablet 20 mg  20 mg Oral Daily Radha Marines, APRN - CNS        pantoprazole (PROTONIX) 40 mg in sodium chloride (PF) 10 mL injection  40 mg IntraVENous Daily Radha Marines, APRN - CNS   40 mg at 10/09/22 2208    sodium chloride flush 0.9 % injection 5-40 mL  5-40 mL IntraVENous 2 times per day Radha Marines, APRN - CNS        sodium chloride flush 0.9 % injection 10 mL  10 mL IntraVENous PRN Radha Marines, APRN - CNS   10 mL at 10/10/22 1007    0.9 % sodium chloride infusion   IntraVENous PRN Radha Marines, APRN - CNS        potassium chloride (KLOR-CON M) extended release tablet 40 mEq  40 mEq Oral PRN Radha Marines, APRN - CNS   40 mEq at 10/10/22 2944    Or    potassium bicarb-citric acid (EFFER-K) effervescent tablet 40 mEq  40 mEq Oral PRN Radha Marines, APRN - CNS        Or    potassium chloride 10 mEq/100 mL IVPB (Peripheral Line)  10 mEq IntraVENous PRN Radha Marines, APRN - CNS        magnesium sulfate 1000 mg in dextrose 5% 100 mL IVPB  1,000 mg IntraVENous PRN Radha Marines, APRN - CNS        ondansetron (ZOFRAN-ODT) disintegrating tablet 4 mg  4 mg Oral Q8H PRN Radha Marines, APRN - CNS        Or    ondansetron (ZOFRAN) injection 4 mg  4 mg IntraVENous Q6H PRN Radha Marines, APRN - CNS        0.9 % sodium chloride infusion   IntraVENous Continuous Radha Marines, APRN - CNS   Stopped at 10/10/22 0234    spironolactone (ALDACTONE) tablet 25 mg  25 mg Oral Daily Kelly Gianna, APRN - CNS        And    hydroCHLOROthiazide (HYDRODIURIL) tablet 25 mg  25 mg Oral Daily Kelly Gianna APRN - CNS           Allergies: Allergies   Allergen Reactions    Tape Kaylene An Tape]      blisters       Problem List:    Patient Active Problem List   Diagnosis Code    Hyperlipidemia E78.5    GERD (gastroesophageal reflux disease) K21.9    Allergic rhinitis J30.9    Family history of breast cancer Z80.3    Family history of colon cancer Z80.0    Acute appendicitis K35.80       Past Medical History:        Diagnosis Date    Allergic rhinitis     GERD (gastroesophageal reflux disease)     Hyperlipidemia        Past Surgical History:        Procedure Laterality Date    BLADDER REPAIR      BREAST SURGERY      lumpectomy    COLONOSCOPY      COLONOSCOPY N/A 2020    : COLONOSCOPY DIAGNOSTIC (N/A )     COLONOSCOPY N/A 2020    COLONOSCOPY DIAGNOSTIC performed by Emery Woods IV, DO at [address] (CERVIX STATUS UNKNOWN)      TONSILLECTOMY         Social History:    Social History     Tobacco Use    Smoking status: Former     Packs/day: 1.00     Years: 30.00     Pack years: 30.00     Types: Cigarettes     Quit date: 10/8/1988     Years since quittin.0    Smokeless tobacco: Never   Substance Use Topics    Alcohol use:  No                                Counseling given: Not Answered      Vital Signs (Current):   Vitals:    10/09/22 2159 10/10/22 0328 10/10/22 0640 10/10/22 0744   BP: 127/69 (!) 115/52  (!) 112/57   Pulse: 78 83  75   Resp: 18 18  16   Temp: 98.2 °F (36.8 °C) 98.8 °F (37.1 °C)  98.4 °F (36.9 °C)   TempSrc: Oral Oral  Oral   SpO2: 93% 94%  92%   Weight:   174 lb 2.6 oz (79 kg)    Height:                                                  BP Readings from Last 3 Encounters:   10/10/22 (!) 112/57   22 136/76   06/10/22 131/75       NPO Status: BMI:   Wt Readings from Last 3 Encounters:   10/10/22 174 lb 2.6 oz (79 kg)   08/22/22 165 lb (74.8 kg)   06/10/22 169 lb 9.6 oz (76.9 kg)     Body mass index is 32.91 kg/m². CBC:   Lab Results   Component Value Date/Time    WBC 16.4 10/09/2022 06:26 PM    RBC 5.03 10/09/2022 06:26 PM    HGB 14.7 10/09/2022 06:26 PM    HCT 43.6 10/09/2022 06:26 PM    MCV 86.6 10/09/2022 06:26 PM    RDW 12.9 10/09/2022 06:26 PM     10/09/2022 06:26 PM       CMP:   Lab Results   Component Value Date/Time     10/10/2022 06:26 AM    K 3.1 10/10/2022 06:26 AM     10/10/2022 06:26 AM    CO2 27 10/10/2022 06:26 AM    BUN 11 10/10/2022 06:26 AM    CREATININE 0.70 10/10/2022 06:26 AM    GFRAA >60 04/02/2021 09:45 AM    LABGLOM >60 10/10/2022 06:26 AM    GLUCOSE 106 10/10/2022 06:26 AM    PROT 7.4 10/09/2022 06:26 PM    CALCIUM 8.0 10/10/2022 06:26 AM    BILITOT 0.4 10/09/2022 06:26 PM    ALKPHOS 62 10/09/2022 06:26 PM    AST 17 10/09/2022 06:26 PM    ALT 10 10/09/2022 06:26 PM       POC Tests: No results for input(s): POCGLU, POCNA, POCK, POCCL, POCBUN, POCHEMO, POCHCT in the last 72 hours.     Coags:   Lab Results   Component Value Date/Time    PROTIME 10.5 10/10/2022 06:26 AM    INR 1.0 10/10/2022 06:26 AM       HCG (If Applicable): No results found for: PREGTESTUR, PREGSERUM, HCG, HCGQUANT     ABGs: No results found for: PHART, PO2ART, OZY6GLH, GAQ5ZEE, BEART, P1ZJQUFK     Type & Screen (If Applicable):  No results found for: LABABO, LABRH    Drug/Infectious Status (If Applicable):  No results found for: HIV, HEPCAB    COVID-19 Screening (If Applicable):   Lab Results   Component Value Date/Time    COVID19 Not Detected 08/20/2020 08:50 AM           Anesthesia Evaluation  Patient summary reviewed and Nursing notes reviewed no history of anesthetic complications:   Airway: Mallampati: II  TM distance: >3 FB   Neck ROM: full  Mouth opening: > = 3 FB   Dental: normal exam         Pulmonary:Negative Pulmonary ROS and normal exam  breath sounds clear to auscultation                             Cardiovascular:  Exercise tolerance: no interval change,   (+) hyperlipidemia        Rhythm: regular  Rate: normal                    Neuro/Psych:   Negative Neuro/Psych ROS              GI/Hepatic/Renal:   (+) GERD:,           Endo/Other: Negative Endo/Other ROS                    Abdominal:       Abdomen: soft and tender. Vascular: negative vascular ROS. Other Findings:           Anesthesia Plan      general     ASA 2     (GA and TAP block)  Induction: intravenous. MIPS: Postoperative opioids intended and Prophylactic antiemetics administered. Anesthetic plan and risks discussed with patient. Plan discussed with CRNA.           Post-op pain plan if not by surgeon: single peripheral nerve block            Candace Chaudhary MD   10/10/2022

## 2022-10-10 NOTE — H&P
[facility]  Office: [address], DO, Radha Kee, DO, Angelina John, DO, Lei Bowman Blood, DO, Tanvi Ellis MD, Lucas Irvin MD, Christiano Payne MD, Venita Neves MD,  Tom Barker MD, Elma Peterson MD, Gregg Harding DO, Luisana Cordon MD,  Kenna Arreaga MD, Amanda Kuhn MD, Ina Armenta DO, Thomas Nath MD, Erinn Nails MD, Pop Perkins MD, Gareth Zheng MD, Tangela Boone MD, Sindhu Holloway MD, Blanca Morales DO, Maryana Gay MD, Berkley Rod MD, Erlin Ochoa, CNP,  Shauna Alvarenga, CNP, Calvin Pro, CNP, Murtaza Burton, CNP,  Fish Dumas, DNP, Sophie Barreto, CNP, Nitin Woodruff, CNP, Joe Olivera, CNP, Sinan Duenas, CNP, Jimmie Wilson, CNP, Carol Morales PABrysonC, Lima Pickens, CNS, Rajesh Baptiste, DNP, Jorge L Colon, CNP, Nicole Campuzano, CNP, Rodney Zhu, CNP         [facility]    HISTORY AND PHYSICAL EXAMINATION            Date:   10/10/2022  Patient name:  Greg Holcomb  Date of admission:  10/9/2022  5:55 PM  MRN:   [id number]  Account:  [de-identified]  YOB: 1955  PCP:    Bethany Wilkins DO  Room:   [facility]/NONE  Code Status:    Full Code    Chief Complaint:     Chief Complaint   Patient presents with    Abdominal Pain     Right lower quad since 0400 today     Patient presents to the hospital due to right lower quadrant pain, patient states \"I am okay with the pain medications\"    History Obtained From:     patient, electronic medical record    History of Present Illness:     Greg Holcomb is a 75 y.o. Non- / non  female who presents with Abdominal Pain (Right lower quad since 0400 today)   and is admitted to the hospital for the management of Acute appendicitis. This very pleasant 76-year-old female was awoken with severe right lower quadrant pain.   She presented to the hospital where imaging studies have shown a acute appendicitis. The patient is scheduled for surgical intervention today at 10:30 AM.  Multiple questions answered regarding this. She is maintained on Zosyn and it is anticipated providing it is a uneventful appendectomy she can be discharged home later today. She denies any questions or concerns regarding this. Overall she feels well and states that her pain is under excellent control. Past Medical History:     Past Medical History:   Diagnosis Date    Allergic rhinitis     GERD (gastroesophageal reflux disease)     Hyperlipidemia         Past Surgical History:     Past Surgical History:   Procedure Laterality Date    BLADDER REPAIR      BREAST SURGERY      lumpectomy    COLONOSCOPY      COLONOSCOPY N/A 08/24/2020    : COLONOSCOPY DIAGNOSTIC (N/A )     COLONOSCOPY N/A 8/24/2020    COLONOSCOPY DIAGNOSTIC performed by Sameer Woods IV, DO at . [address] (CERVIX STATUS UNKNOWN)      TONSILLECTOMY          Medications Prior to Admission:     Prior to Admission medications    Medication Sig Start Date End Date Taking?  Authorizing Provider   ibuprofen (IBU) 600 MG tablet Take 1 tablet by mouth every 8 hours as needed for Pain 8/22/22   Roland Arellano MD   citalopram (CELEXA) 20 MG tablet TAKE 1 TABLET BY MOUTH IN  THE MORNING 8/1/22   Hope Matute DO   spironolactone-hydroCHLOROthiazide (ALDACTAZIDE) 25-25 MG per tablet TAKE 1 TABLET BY MOUTH  DAILY 8/1/22 8/1/23  Hope Matute DO   cholestyramine Lesle Colander) 4 g packet Take 1 packet by mouth daily 6/10/22   Hope Matute DO   AL 0.1 MG/24HR APPLY TWO TIMES WEEKLY AS DIRECTED 5/2/22   Hope Matute DO   TRANSDERM-SCOP, 1.5 MG, TRANSDERMAL PATCH APPLY 1 PATCH TOPICALLY EVERY 72 HOURS 12/13/21   Sung Chu MD   fexofenadine-pseudoephedrine (ALLEGRA-D 24HR) 180-240 MG per extended release tablet Take 1 tablet by mouth daily 6/18/21   Hope Matute DO   Ascorbic Acid (VITAMIN C PO) Take by mouth daily    Historical Provider, MD   Ginkgo Biloba 120 MG CAPS Take 120 mg by mouth daily    Historical Provider, MD   valACYclovir (VALTREX) 500 MG tablet TAKE 1 CAPLET BY MOUTH THREE TIMES DAILY 12/30/20   Samreen Gerardo DO   Multiple Vitamins-Minerals (HM MULTIVITAMIN ADULT GUMMY PO) Take 1 each by mouth    Historical Provider, MD   esomeprazole (NEXIUM) 20 MG delayed release capsule TAKE 1 CAPSULE DAILY 5/16/19   Samreen Gerardo DO   mometasone (NASONEX) 50 MCG/ACT nasal spray 2 sprays by Nasal route daily as needed (nasal congestion) 5/16/19   Samreen Gerardo DO   UNABLE TO FIND Take 1 capsule by mouth nightly. Progesterone DHEA 7 keto DHEA 75-10 SR 3/13/14   Samreen Gerardo DO        Allergies:     Tape Nataliia Boone tape]    Social History:     Tobacco:    reports that she quit smoking about 34 years ago. Her smoking use included cigarettes. She has a 30.00 pack-year smoking history. She has never used smokeless tobacco.  Alcohol:      reports no history of alcohol use. Drug Use:  reports no history of drug use. Family History:     Family History   Problem Relation Age of Onset    Breast Cancer Mother     Obesity Mother     Obesity Father     Obesity Brother     Hypertension Maternal Aunt        Review of Systems:     Positive and Negative as described in HPI.     CONSTITUTIONAL:  negative for fevers, chills, sweats, fatigue, weight loss  HEENT:  negative for vision, hearing changes, runny nose, throat pain  RESPIRATORY:  negative for shortness of breath, cough, congestion, wheezing  CARDIOVASCULAR:  negative for chest pain, palpitations  GASTROINTESTINAL:  negative for nausea, vomiting, diarrhea, constipation, change in bowel habits, abdominal pain   GENITOURINARY:  negative for difficulty of urination, burning with urination, frequency   INTEGUMENT:  negative for rash, skin lesions, easy bruising   HEMATOLOGIC/LYMPHATIC:  negative for swelling/edema ALLERGIC/IMMUNOLOGIC:  negative for urticaria , itching  ENDOCRINE:  negative increase in drinking, increase in urination, hot or cold intolerance  MUSCULOSKELETAL:  negative joint pains, muscle aches, swelling of joints  NEUROLOGICAL:  negative for headaches, dizziness, lightheadedness, numbness, pain, tingling extremities  BEHAVIOR/PSYCH:  negative for depression, anxiety    Physical Exam:   BP (!) 109/52   Pulse 64   Temp 98.4 °F (36.9 °C) (Oral)   Resp 14   Ht 5' 1\" (1.549 m)   Wt 174 lb 2.6 oz (79 kg)   LMP 1986 (Within Months)   SpO2 (!) 85%   BMI 32.91 kg/m²   Temp (24hrs), Av.6 °F (37 °C), Min:98.2 °F (36.8 °C), Max:99 °F (37.2 °C)    No results for input(s): POCGLU in the last 72 hours.     Intake/Output Summary (Last 24 hours) at 10/10/2022 1129  Last data filed at 10/10/2022 0617  Gross per 24 hour   Intake 1467.01 ml   Output --   Net 1467.01 ml       General Appearance:  alert, well appearing, and in no acute distress  Mental status: oriented to person, place, and time  Head:  normocephalic, atraumatic  Eye: no icterus, redness, pupils equal and reactive, extraocular eye movements intact, conjunctiva clear  Ear: normal external ear, no discharge, hearing intact  Nose:  no drainage noted  Mouth: mucous membranes moist  Neck: supple, no carotid bruits, thyroid not palpable  Lungs: Bilateral equal air entry, clear to ausculation, no wheezing, rales or rhonchi, normal effort  Cardiovascular: normal rate, regular rhythm, no murmur, gallop, rub  Abdomen: Soft, mild tenderness in the right lower quadrant, nondistended, normal bowel sounds, no hepatomegaly or splenomegaly  Neurologic: There are no new focal motor or sensory deficits, normal muscle tone and bulk, no abnormal sensation, normal speech, cranial nerves II through XII grossly intact  Skin: No gross lesions, rashes, bruising or bleeding on exposed skin area  Extremities:  peripheral pulses palpable, no pedal edema or calf pain with palpation  Psych: normal affect     Investigations:      Laboratory Testing:  Recent Results (from the past 24 hour(s))   Urinalysis with Reflex to Culture    Collection Time: 10/09/22  6:17 PM    Specimen: Urine, clean catch   Result Value Ref Range    Color, UA Yellow Yellow    Turbidity UA Clear Clear    Glucose, Ur NEGATIVE NEGATIVE    Bilirubin Urine NEGATIVE NEGATIVE    Ketones, Urine NEGATIVE NEGATIVE    Specific Gravity, UA 1.020 1.005 - 1.030    Urine Hgb NEGATIVE NEGATIVE    pH, UA 6.5 5.0 - 8.0    Protein, UA NEGATIVE NEGATIVE    Urobilinogen, Urine Normal Normal    Nitrite, Urine NEGATIVE NEGATIVE    Leukocyte Esterase, Urine NEGATIVE NEGATIVE    Urinalysis Comments       Microscopic exam not performed based on chemical results unless requested in original order. Urinalysis Comments          Urinalysis Comments       Utilizing a urinalysis as the only screening method to exclude a potential uropathogen can be unreliable in many patient populations. Rapid screening tests are less sensitive than culture and if UTI is a clinical possibility, culture should be considered despite a negative urinalysis.      CBC with Auto Differential    Collection Time: 10/09/22  6:26 PM   Result Value Ref Range    WBC 16.4 (H) 3.5 - 11.0 k/uL    RBC 5.03 4.0 - 5.2 m/uL    Hemoglobin 14.7 12.0 - 16.0 g/dL    Hematocrit 43.6 36 - 46 %    MCV 86.6 80 - 100 fL    MCH 29.2 26 - 34 pg    MCHC 33.6 31 - 37 g/dL    RDW 12.9 12.5 - 15.4 %    Platelets 031 049 - 233 k/uL    MPV 9.1 6.0 - 12.0 fL    Seg Neutrophils 77 (H) 36 - 66 %    Lymphocytes 13 (L) 24 - 44 %    Monocytes 8 2 - 11 %    Eosinophils % 1 1 - 4 %    Basophils 1 0 - 2 %    Segs Absolute 12.80 (H) 1.8 - 7.7 k/uL    Absolute Lymph # 2.10 1.0 - 4.8 k/uL    Absolute Mono # 1.40 (H) 0.1 - 1.2 k/uL    Absolute Eos # 0.10 0.0 - 0.4 k/uL    Basophils Absolute 0.10 0.0 - 0.2 k/uL   Comprehensive Metabolic Panel w/ Reflex to MG    Collection Time: 10/09/22  6:26 PM   Result Value Ref Range    Glucose 107 (H) 70 - 99 mg/dL    BUN 10 8 - 23 mg/dL    Creatinine 0.66 0.50 - 0.90 mg/dL    Est, Glom Filt Rate >60 >60 mL/min/1.73m2    Calcium 9.2 8.6 - 10.4 mg/dL    Sodium 137 135 - 144 mmol/L    Potassium 3.3 (L) 3.7 - 5.3 mmol/L    Chloride 98 98 - 107 mmol/L    CO2 27 20 - 31 mmol/L    Anion Gap 12 9 - 17 mmol/L    Alkaline Phosphatase 62 35 - 104 U/L    ALT 10 5 - 33 U/L    AST 17 <32 U/L    Total Bilirubin 0.4 0.3 - 1.2 mg/dL    Total Protein 7.4 6.4 - 8.3 g/dL    Albumin 4.1 3.5 - 5.2 g/dL    Albumin/Globulin Ratio 1.2 1.0 - 2.5   Lipase    Collection Time: 10/09/22  6:26 PM   Result Value Ref Range    Lipase 26 13 - 60 U/L   Magnesium    Collection Time: 10/09/22  6:26 PM   Result Value Ref Range    Magnesium 1.9 1.6 - 2.6 mg/dL   Basic Metabolic Panel w/ Reflex to MG    Collection Time: 10/10/22  6:26 AM   Result Value Ref Range    Glucose 106 (H) 70 - 99 mg/dL    BUN 11 8 - 23 mg/dL    Creatinine 0.70 0.50 - 0.90 mg/dL    Est, Glom Filt Rate >60 >60 mL/min/1.73m2    Calcium 8.0 (L) 8.6 - 10.4 mg/dL    Sodium 139 135 - 144 mmol/L    Potassium 3.1 (L) 3.7 - 5.3 mmol/L    Chloride 103 98 - 107 mmol/L    CO2 27 20 - 31 mmol/L    Anion Gap 9 9 - 17 mmol/L   Protime-INR    Collection Time: 10/10/22  6:26 AM   Result Value Ref Range    Protime 10.5 9.4 - 12.6 sec    INR 1.0    Magnesium    Collection Time: 10/10/22  6:26 AM   Result Value Ref Range    Magnesium 1.9 1.6 - 2.6 mg/dL       Imaging/Diagnostics:  CT ABDOMEN PELVIS WO CONTRAST Additional Contrast? None    Result Date: 10/9/2022  Acute appendicitis The findings were sent to the Radiology Results Communication Center at 7:46 pm on 10/9/2022 to be communicated to a licensed caregiver.  RECOMMENDATIONS: Surgical consultation       Assessment :      Hospital Problems             Last Modified POA    * (Principal) Acute appendicitis 10/9/2022 Yes       Plan:     Patient status inpatient in the Med/Surge    Acute appendicitis  Continue Zosyn  Surgical intervention today  Continue pain control  Transition to oral medications postoperatively  Hypokalemia  Electrolyte replacement  Okay to give oral replacement preoperatively with small sips of water    Consultations:   None    Patient is admitted as inpatient status because of co-morbidities listed above, severity of signs and symptoms as outlined, requirement for current medical therapies and most importantly because of direct risk to patient if care not provided in a hospital setting. Expected length of stay > 48 hours.     Caitlyn Duffy DO  10/10/2022  11:29 AM    Copy sent to Dr. Riley Charles DO

## 2022-10-10 NOTE — PROGRESS NOTES
Patient discharged with all belongings and discharge paperwork. No other questions or concerns voiced at this time.

## 2022-10-10 NOTE — PLAN OF CARE
Problem: Discharge Planning  Goal: Discharge to home or other facility with appropriate resources  10/10/2022 1009 by Yola Santizo RN  Outcome: Progressing  10/10/2022 0045 by Gabriela Mckeon RN  Outcome: Progressing  Flowsheets (Taken 10/9/2022 2149)  Discharge to home or other facility with appropriate resources:   Identify barriers to discharge with patient and caregiver   Identify discharge learning needs (meds, wound care, etc)   Refer to discharge planning if patient needs post-hospital services based on physician order or complex needs related to functional status, cognitive ability or social support system   Arrange for needed discharge resources and transportation as appropriate     Problem: Pain  Goal: Verbalizes/displays adequate comfort level or baseline comfort level  10/10/2022 1009 by Yola Santizo RN  Outcome: Progressing  Flowsheets (Taken 10/10/2022 0744 by Tono Turner RN)  Verbalizes/displays adequate comfort level or baseline comfort level:   Encourage patient to monitor pain and request assistance   Assess pain using appropriate pain scale  10/10/2022 0045 by Gabriela Mckeon RN  Outcome: Progressing     Problem: ABCDS Injury Assessment  Goal: Absence of physical injury  Outcome: Progressing

## 2022-10-10 NOTE — ANESTHESIA PROCEDURE NOTES
Peripheral Block    Patient location during procedure: pre-op  Reason for block: post-op pain management and at surgeon's request  Start time: 10/10/2022 10:48 AM  End time: 10/10/2022 10:51 AM  Staffing  Performed: anesthesiologist   Anesthesiologist: Cora Fritz MD  Preanesthetic Checklist  Completed: patient identified, IV checked, site marked, risks and benefits discussed, surgical/procedural consents, equipment checked, pre-op evaluation, timeout performed, anesthesia consent given, oxygen available, monitors applied/VS acknowledged, fire risk safety assessment completed and verbalized and blood product R/B/A discussed and consented  Peripheral Block   Patient position: supine  Prep: ChloraPrep  Provider prep: mask and sterile gloves  Patient monitoring: cardiac monitor, continuous pulse ox, frequent blood pressure checks, IV access, oxygen and responsive to questions  Block type: TAP  Laterality: bilateral  Injection technique: single-shot  Guidance: ultrasound guided  Local infiltration: bupivacaine  Infiltration strength: 0.25 %  Local infiltration: bupivacaine  Dose: 4 mL    Needle   Needle type: insulated echogenic nerve stimulator needle   Needle gauge: 20 G  Needle localization: ultrasound guidance  Needle insertion depth: 8 cm  Test dose: negative  Expiration date: 6/30/2027  Kit: [id number]  Lot Number: [id number]  Needle length: 10 cm  Assessment   Injection assessment: negative aspiration for heme, no paresthesia on injection, no intravascular symptoms and local visualized surrounding nerve on ultrasound  Paresthesia pain: none  Slow fractionated injection: yes  Hemodynamics: stable  Outcomes: uncomplicated and patient tolerated procedure well    Medications Administered  bupivacaine (PF) 0.25 % - Perineural   50 mL - 10/10/2022 10:48:00 AM

## 2022-10-10 NOTE — CARE COORDINATION
10/10/22 0849   Service Assessment   Patient Orientation Alert and Oriented   Cognition Alert   History Provided By Patient   Primary Caregiver Self   Accompanied By/Relationship Daughter   Support Systems Children;Spouse/Significant Other   PCP Verified by CM Yes   Last Visit to PCP Within last 3 months   Prior Functional Level Independent in ADLs/IADLs   Current Functional Level Independent in ADLs/IADLs   Can patient return to prior living arrangement Yes   Ability to make needs known: Good   Family able to assist with home care needs: Yes   Would you like for me to discuss the discharge plan with any other family members/significant others, and if so, who? Yes   Social/Functional History   Lives With Spouse   Type of [address] One level   Bathroom Shower/Tub Walk-in shower   Bathroom Toilet Standard   Bathroom Accessibility Accessible   Receives Help From [address] Responsibilities Yes   Ambulation Assistance Independent   Transfer Assistance Independent   Active  Yes   Mode of Transportation Car   Occupation Retired   Discharge Planning   Living Arrangements Spouse/Significant Other   [address] [facility] [place] Medications No   Patient expects to be discharged to: House   Follow Up Appointment: Best Day/Time    (Any time)   History of falls? 0   Services At/After Discharge   Mode of Transport at Discharge Self   Confirm Follow Up Transport Family   Condition of Participation: Discharge Planning   The Plan for Transition of Care is related to the following treatment goals: Pain control   The Patient and/or Patient Representative was provided with a Choice of Provider? Patient   The Patient and/Or Patient Representative agree with the Discharge Plan?  Yes   Freedom of Choice list was provided with basic dialogue that supports the patient's individualized plan of care/goals, treatment preferences, and shares the quality data associated with the providers?   Yes     Home independent with family, follow for post op discharge needs

## 2022-10-10 NOTE — ANESTHESIA POSTPROCEDURE EVALUATION
POST- ANESTHESIA EVALUATION       Pt Name: Moy Knox  MRN: [id number]  [place]: 1955  Date of evaluation: 10/10/2022  Time:  2:47 PM      BP (!) 112/57   Pulse 77   Temp 98.2 °F (36.8 °C) (Oral)   Resp 16   Ht 5' 1\" (1.549 m)   Wt 174 lb 2.6 oz (79 kg)   LMP 07/25/1986 (Within Months)   SpO2 93%   BMI 32.91 kg/m²      Consciousness Level  Awake  Cardiopulmonary Status  Stable  Pain Adequately Treated YES  Nausea / Vomiting  NO  Adequate Hydration  YES  Anesthesia Related Complications NONE      Electronically signed by Nadya Schultz MD on 10/10/2022 at 2:47 PM       Department of Anesthesiology  Postprocedure Note    Patient: Moy Knox  MRN: [id number]  [place]: 1955  Date of evaluation: 10/10/2022      Procedure Summary     Date: 10/10/22 Room / Location: [address]    Anesthesia Start: 1056 Anesthesia Stop: 1215    Procedure: APPENDECTOMY LAPAROSCOPIC ROBOTIC (Abdomen) Diagnosis:       Acute appendicitis, unspecified acute appendicitis type      (Acute appendicitis, unspecified acute appendicitis type [K35.80])    Surgeons: Betty Dominique DO Responsible Provider: Nadya Schultz MD    Anesthesia Type: general ASA Status: 2          Anesthesia Type: No value filed.     Jacinto Phase I: Jacinto Score: 9    Jacinto Phase II:        Anesthesia Post Evaluation

## 2022-10-10 NOTE — PROGRESS NOTES
Physical Therapy        Physical Therapy Cancel Note      DATE: 10/10/2022    NAME: Greg Holcomb  MRN: [id number]   : 1955      Patient not seen this date for Physical Therapy due to:    Surgery/Procedure: Appendectomy @ 10:30 AM      Electronically signed by Rachelle Cain PT on 10/10/2022 at 9:16 AM

## 2022-10-10 NOTE — OP NOTE
Operative Note        Patient: Casimiro Favre  YOB: 1955  MRN: [id number]  Lana Gan,       Date of Procedure: 10/10/2022    Pre-Op Diagnosis: Acute appendicitis, suppurative    Post-Op Diagnosis: Same       Procedure(s):  APPENDECTOMY LAPAROSCOPIC ROBOTIC    Surgeon(s):  Nadia Aguayo DO    Assistant:  Resident: David Hector DO    Anesthesia: General + US guided TAP block    Estimated Blood Loss (mL): 10 ml    Complications: None    Specimens:   ID Type Source Tests Collected by Time Destination   A : APPENDIX Tissue Appendix [id number] [facility] Char[place], DO 10/10/2022 1142        Counts: correct    Procedure details:    Please see H&P. Pre-op IV antibiotics given. Informed consent obtained. Patient brought to OR suite, placed under general anesthesia, positioned appropriately, surgical pause performed, site prepped and draped in sterile fashion. US guided TAP block was performed by anesthesia. No chiu was placed. Pneumoperitoneum is established to 15 mm Hg via Veress needle technique in the LUQ. 8 mm robotic camera port placed left mid abdomen and camera placed in to confirm proper entry. Veress needle confirmed and removed. Additional 8 mm robotic port placed under direct visualization in the suprapubic region and the robotic stapler port placed in the LUQ. Patient was positioned appropriately. The Da Webtab model was docked appropriately. Instruments robotically employed were fenestrated bipolars, vessel sealer, and stapler 60 mm vascular load. Findings:  acute suppurative retrocecal appendicitis normal cecum, normal terminal ileum. Window is created at base of appendix. Mesoappendix taken down with vessel sealer staying close to the appendix, away from the terminal ileum. Appendectomy performed with 60 mm length vascular cartridge and fired successfully. Staple line hemostatic and intact.      After the robot was undocked, the appendix was placed in the specimen bag and removed and submitted. Irrigation was performed with 3 liters sterile saline. Pneumoperitoneum is released. Wounds irrigated and vlosed sucuticular fashion. Fascia at larger port site closed with Dash-Ava device with 0 vicryl. All sponge, needle, and instrument counts correct. I spoke with family afterwards.      Electronically signed by Sarah Mosqueda DO, FACOS, FACS on 10/10/2022 at 12:03 PM

## 2022-10-10 NOTE — PROGRESS NOTES
Occupational [facility]  Occupational Therapy Not Seen Note    DATE: 10/10/2022    NAME: Vinh Peña  MRN: [id number]   : 1955      Patient not seen this date for Occupational Therapy due to:    Surgery/Procedure:  Pt off the floor, underwent Appendectomy. OT will re-attempt when appropriate.       Electronically signed by JESUS Awan OTR/L on 10/10/2022 at 12:55 PM

## 2022-10-11 LAB — SURGICAL PATHOLOGY REPORT: NORMAL

## 2022-11-22 ENCOUNTER — TELEPHONE (OUTPATIENT)
Dept: FAMILY MEDICINE CLINIC | Age: 67
End: 2022-11-22

## 2022-11-22 NOTE — TELEPHONE ENCOUNTER
Patient called to schedule AWV, patient is currently out of town and will call office once she returns.

## 2022-12-13 ENCOUNTER — TELEPHONE (OUTPATIENT)
Dept: INTERNAL MEDICINE | Age: 67
End: 2022-12-13

## 2022-12-13 NOTE — TELEPHONE ENCOUNTER
----- Message from Josh Segundo sent at 12/12/2022 10:13 PM EST -----  Regarding: Kalen Katz  I have pain in my right ear, like an ear infection   I was wondering if you could call in a script for me ? I am trying the over the counter ear drops and sinus meds, but it feels like I am going to need an antibiotic to get this under control. If you can call in a script  [facility] in [place]  Phone Number:  [phone]    Also, we have a cruise next month, can you please call in a script for the sea sick patches that go behind your ears ?   I have had these nearly every year so I don't get sick on the cruise  Thank you SO much  Kary Wetzel!!  Cathryn Leigh

## 2022-12-28 ENCOUNTER — TELEPHONE (OUTPATIENT)
Dept: FAMILY MEDICINE CLINIC | Age: 67
End: 2022-12-28

## 2022-12-28 NOTE — TELEPHONE ENCOUNTER
Writer spoke to patient and informed that pt is due for mammogram. Pt stated will reach out to [facility] to gaby mammogram.

## 2023-01-03 DIAGNOSIS — Z87.898 HX OF MOTION SICKNESS: Primary | ICD-10-CM

## 2023-01-03 RX ORDER — SCOLOPAMINE TRANSDERMAL SYSTEM 1 MG/1
1 PATCH, EXTENDED RELEASE TRANSDERMAL
Qty: 8 PATCH | Refills: 1 | Status: SHIPPED | OUTPATIENT
Start: 2023-01-03

## 2023-01-03 NOTE — TELEPHONE ENCOUNTER
Refill request for Trans-Derm Scop Patch. Patient taking a trip at the end of the month, [place]. Approved. Sent to pharmacy.

## 2023-01-05 DIAGNOSIS — Z87.898 HX OF MOTION SICKNESS: ICD-10-CM

## 2023-01-06 RX ORDER — SCOPOLAMINE 1 MG/3D
PATCH, EXTENDED RELEASE TRANSDERMAL
Qty: 4 PATCH | Refills: 0 | OUTPATIENT
Start: 2023-01-06

## 2023-01-09 ENCOUNTER — OFFICE VISIT (OUTPATIENT)
Dept: FAMILY MEDICINE CLINIC | Age: 68
End: 2023-01-09
Payer: MEDICARE

## 2023-01-09 VITALS
SYSTOLIC BLOOD PRESSURE: 112 MMHG | HEIGHT: 61 IN | BODY MASS INDEX: 32.25 KG/M2 | DIASTOLIC BLOOD PRESSURE: 62 MMHG | HEART RATE: 77 BPM | WEIGHT: 170.8 LBS

## 2023-01-09 DIAGNOSIS — Z00.00 MEDICARE ANNUAL WELLNESS VISIT, SUBSEQUENT: Primary | ICD-10-CM

## 2023-01-09 PROCEDURE — G0439 PPPS, SUBSEQ VISIT: HCPCS | Performed by: FAMILY MEDICINE

## 2023-01-09 PROCEDURE — 1123F ACP DISCUSS/DSCN MKR DOCD: CPT | Performed by: FAMILY MEDICINE

## 2023-01-09 ASSESSMENT — PATIENT HEALTH QUESTIONNAIRE - PHQ9
SUM OF ALL RESPONSES TO PHQ9 QUESTIONS 1 & 2: 0
1. LITTLE INTEREST OR PLEASURE IN DOING THINGS: 0
SUM OF ALL RESPONSES TO PHQ QUESTIONS 1-9: 0
SUM OF ALL RESPONSES TO PHQ QUESTIONS 1-9: 0
2. FEELING DOWN, DEPRESSED OR HOPELESS: 0
SUM OF ALL RESPONSES TO PHQ QUESTIONS 1-9: 0
SUM OF ALL RESPONSES TO PHQ QUESTIONS 1-9: 0

## 2023-01-09 ASSESSMENT — LIFESTYLE VARIABLES
HOW OFTEN DO YOU HAVE A DRINK CONTAINING ALCOHOL: NEVER
HOW MANY STANDARD DRINKS CONTAINING ALCOHOL DO YOU HAVE ON A TYPICAL DAY: PATIENT DOES NOT DRINK

## 2023-01-09 NOTE — PROGRESS NOTES
Medicare Annual Wellness Visit    Kailey Bustos is here for Medicare AWV (Annual)    Assessment & Plan   Medicare annual wellness visit, subsequent      Recommendations for Preventive Services Due: see orders and patient instructions/AVS.  Recommended screening schedule for the next 5-10 years is provided to the patient in written form: see Patient Instructions/AVS.     No follow-ups on file. Subjective   The following acute and/or chronic problems were also addressed today:  GERD    Patient's complete Health Risk Assessment and screening values have been reviewed and are found in Flowsheets. The following problems were reviewed today and where indicated follow up appointments were made and/or referrals ordered. Positive Risk Factor Screenings with Interventions:    Fall Risk:  Do you feel unsteady or are you worried about falling? : no  2 or more falls in past year?: no  Fall with injury in past year?: (!) yes (right foot sprain)     Interventions:    Patient declines any further evaluation or treatment    Cognitive:       Clock Drawing Test (CDT): Normal       Total Score Interpretation: Abnormal Mini-Cog      Interventions:  Patient declines any further evaluation or treatment             Weight and Activity:  Physical Activity: Sufficiently Active    Days of Exercise per Week: 7 days    Minutes of Exercise per Session: 30 min     On average, how many days per week do you engage in moderate to strenuous exercise (like a brisk walk)?: 7 days  Have you lost any weight without trying in the past 3 months?: No  Body mass index: (!) 32.27    Obesity Interventions:  Patient declines any further evaluation or treatment               Advanced Directives:  Do you have a Living Will?: (!) No (needs assistance)    Intervention:                         Objective   Vitals:    01/09/23 0935   Weight: 170 lb 12.8 oz (77.5 kg)   Height: 5' 1\" (1.549 m)      Body mass index is 32.27 kg/m².                Allergies Allergen Reactions    Tape Pauline Keto Tape]      blisters     Prior to Visit Medications    Medication Sig Taking? Authorizing Provider   scopolamine (TRANSDERM SCOP, 1.5 MG,) transdermal patch Place 1 patch onto the skin every 72 hours Yes Mannie Martinez DO   citalopram (CELEXA) 20 MG tablet TAKE 1 TABLET BY MOUTH IN  THE MORNING Yes Mannie Martinez DO   spironolactone-hydroCHLOROthiazide (ALDACTAZIDE) 25-25 MG per tablet TAKE 1 TABLET BY MOUTH  DAILY Yes Mannie Martinez DO   AL 0.1 MG/24HR APPLY TWO TIMES WEEKLY AS DIRECTED Yes Mannie Martinez DO   fexofenadine-pseudoephedrine (ALLEGRA-D 24HR) 180-240 MG per extended release tablet Take 1 tablet by mouth daily Yes Mannie Martinez DO   Ascorbic Acid (VITAMIN C PO) Take by mouth daily Yes Historical Provider, MD   valACYclovir (VALTREX) 500 MG tablet TAKE 1 CAPLET BY MOUTH THREE TIMES DAILY Yes Mannie Martinez DO   Multiple Vitamins-Minerals (HM MULTIVITAMIN ADULT GUMMY PO) Take 1 each by mouth Yes Historical Provider, MD   esomeprazole (NEXIUM) 20 MG delayed release capsule TAKE 1 CAPSULE DAILY Yes Mannie Martinez DO   mometasone (NASONEX) 50 MCG/ACT nasal spray 2 sprays by Nasal route daily as needed (nasal congestion) Yes Mannie Martinez DO   UNABLE TO FIND Take 1 capsule by mouth nightly.  Progesterone DHEA 7 keto DHEA 75-10 SR Yes Mannie Martinez DO   ibuprofen (IBU) 600 MG tablet Take 1 tablet by mouth every 8 hours as needed for Pain  Patient not taking: Reported on 1/9/2023  Sue Nyhan, MD   cholestyramine Powell June) 4 g packet Take 1 packet by mouth daily  Patient not taking: Reported on 1/9/2023  Mannie Martinez DO   Ginkgo Biloba 120 MG CAPS Take 120 mg by mouth daily  Patient not taking: Reported on 1/9/2023  Historical Provider, MD [place] (Including outside providers/suppliers regularly involved in providing care):   Patient Care Team:  Mannie Martinez DO as PCP - Raúl Jade Maia Kan DO as PCP - [facility] Empaneled Provider     Reviewed and updated this visit:  Tobacco  Allergies  Meds  Med Hx  Surg Hx  Soc Hx  Fam Hx        I, Saurav Ornelas LPN, 2/9/[birth year]337, performed the documented evaluation under the direct supervision of the attending physician.

## 2023-01-09 NOTE — PATIENT INSTRUCTIONS
Personalized Preventive Plan for Renny Segundo - 1/9/2023  Medicare offers a range of preventive health benefits. Some of the tests and screenings are paid in full while other may be subject to a deductible, co-insurance, and/or copay. Some of these benefits include a comprehensive review of your medical history including lifestyle, illnesses that may run in your family, and various assessments and screenings as appropriate. After reviewing your medical record and screening and assessments performed today your provider may have ordered immunizations, labs, imaging, and/or referrals for you. A list of these orders (if applicable) as well as your Preventive Care list are included within your After Visit Summary for your review. Other Preventive Recommendations:    A preventive eye exam performed by an eye specialist is recommended every 1-2 years to screen for glaucoma; cataracts, macular degeneration, and other eye disorders. A preventive dental visit is recommended every 6 months. Try to get at least 150 minutes of exercise per week or 10,000 steps per day on a pedometer . Order or download the FREE \"Exercise & Physical Activity: Your Everyday Guide\" from [employer]. Call 2-583.753.1975 or search [employer] online. You need 2098-6334 mg of calcium and 0203-7015 IU of vitamin D per day. It is possible to meet your calcium requirement with diet alone, but a vitamin D supplement is usually necessary to meet this goal.  When exposed to the sun, use a sunscreen that protects against both UVA and UVB radiation with an SPF of 30 or greater. Reapply every 2 to 3 hours or after sweating, drying off with a towel, or swimming. Always wear a seat belt when traveling in a car. Always wear a helmet when riding a bicycle or motorcycle.

## 2023-01-12 ENCOUNTER — TELEPHONE (OUTPATIENT)
Dept: FAMILY MEDICINE CLINIC | Age: 68
End: 2023-01-12

## 2023-01-12 NOTE — TELEPHONE ENCOUNTER
Patient was last seen 01/09/2023 for AWV. She has stated her sinuses are messing up and hurting her back teeth, she would like to know if she could have a Z-tiffanie. She is going out of town this weekend . Please advise .  Ty

## 2023-02-04 DIAGNOSIS — Z78.0 MENOPAUSE: ICD-10-CM

## 2023-02-05 DIAGNOSIS — Z78.0 MENOPAUSE: ICD-10-CM

## 2023-02-06 DIAGNOSIS — Z78.0 MENOPAUSE: ICD-10-CM

## 2023-02-06 RX ORDER — ESTRADIOL 0.1 MG/D
FILM, EXTENDED RELEASE TRANSDERMAL
Qty: 8 PATCH | Refills: 5 | Status: SHIPPED | OUTPATIENT
Start: 2023-02-06

## 2023-02-06 RX ORDER — ESTRADIOL 0.1 MG/D
FILM, EXTENDED RELEASE TRANSDERMAL
Qty: 8 PATCH | Refills: 5 | OUTPATIENT
Start: 2023-02-06

## 2023-02-06 NOTE — TELEPHONE ENCOUNTER
Last visit: 06/10/22  Last Med refill: MEDICATION IS NOT ON PATIENT CURRENT MED LIST. Does patient have enough medication for 72 hours: No:     Next Visit Date:  No future appointments.     Health Maintenance   Topic Date Due    Hepatitis C screen  Never done    DTaP/Tdap/Td vaccine (1 - Tdap) Never done    Shingles vaccine (2 of 2) 03/29/2017    Pneumococcal 65+ years Vaccine (1 - PCV) Never done    Breast cancer screen  09/04/2021    COVID-19 Vaccine (4 - Booster for Pfizer series) 02/23/2022    A1C test (Diabetic or Prediabetic)  06/10/2023    Depression Screen  01/09/2024    Annual Wellness Visit (AWV)  01/10/2024    Lipids  04/02/2026    Colorectal Cancer Screen  08/24/2030    DEXA (modify frequency per FRAX score)  Completed    Flu vaccine  Completed    Hepatitis A vaccine  Aged Out    Hib vaccine  Aged Out    Meningococcal (ACWY) vaccine  Aged Out       Hemoglobin A1C (%)   Date Value   06/10/2022 5.7             ( goal A1C is < 7)   No results found for: LABMICR  LDL Cholesterol (mg/dL)   Date Value   04/02/2021 156 (H)   07/03/2017 159 (H)       (goal LDL is <100)   AST (U/L)   Date Value   10/09/2022 17     ALT (U/L)   Date Value   10/09/2022 10     BUN (mg/dL)   Date Value   10/10/2022 11     BP Readings from Last 3 Encounters:   01/09/23 112/62   10/10/22 108/62   08/22/22 136/76          (goal 120/80)    All Future Testing planned in [facility]  Lab Frequency Next Occurrence               Patient Active Problem List:     Hyperlipidemia     GERD (gastroesophageal reflux disease)     Allergic rhinitis     Family history of breast cancer     Family history of colon cancer     Acute appendicitis

## 2023-02-06 NOTE — TELEPHONE ENCOUNTER
Estradiol pending for refill     Health Maintenance   Topic Date Due    Hepatitis C screen  Never done    DTaP/Tdap/Td vaccine (1 - Tdap) Never done    Shingles vaccine (2 of 2) 03/29/2017    Pneumococcal 65+ years Vaccine (1 - PCV) Never done    Breast cancer screen  09/04/2021    COVID-19 Vaccine (4 - Booster for Pfizer series) 02/23/2022    A1C test (Diabetic or Prediabetic)  06/10/2023    Depression Screen  01/09/2024    Annual Wellness Visit (AWV)  01/10/2024    Lipids  04/02/2026    Colorectal Cancer Screen  08/24/2030    DEXA (modify frequency per FRAX score)  Completed    Flu vaccine  Completed    Hepatitis A vaccine  Aged Out    Hib vaccine  Aged Out    Meningococcal (ACWY) vaccine  Aged Out             (applicable per patient's age: Cancer Screenings, Depression Screening, Fall Risk Screening, Immunizations)    Hemoglobin A1C (%)   Date Value   06/10/2022 5.7     LDL Cholesterol (mg/dL)   Date Value   04/02/2021 156 (H)     AST (U/L)   Date Value   10/09/2022 17     ALT (U/L)   Date Value   10/09/2022 10     BUN (mg/dL)   Date Value   10/10/2022 11      (goal A1C is < 7)   (goal LDL is <100) need 30-50% reduction from baseline     BP Readings from Last 3 Encounters:   01/09/23 112/62   10/10/22 108/62   08/22/22 136/76    (goal /80)      All Future Testing planned in [facility]:  Lab Frequency Next Occurrence       Next Visit Date:  No future appointments.          Patient Active Problem List:     Hyperlipidemia     GERD (gastroesophageal reflux disease)     Allergic rhinitis     Family history of breast cancer     Family history of colon cancer     Acute appendicitis

## 2023-02-07 RX ORDER — ESTRADIOL 0.1 MG/D
FILM, EXTENDED RELEASE TRANSDERMAL
Qty: 8 PATCH | Refills: 5 | OUTPATIENT
Start: 2023-02-07

## 2023-03-14 ENCOUNTER — HOSPITAL ENCOUNTER (OUTPATIENT)
Dept: WOMENS IMAGING | Age: 68
Discharge: HOME | End: 2023-03-16
Payer: MEDICARE

## 2023-03-14 DIAGNOSIS — Z12.31 ENCOUNTER FOR SCREENING MAMMOGRAM FOR MALIGNANT NEOPLASM OF BREAST: ICD-10-CM

## 2023-03-14 PROCEDURE — 77067 SCR MAMMO BI INCL CAD: CPT

## 2023-03-15 ENCOUNTER — TELEPHONE (OUTPATIENT)
Dept: SURGERY | Age: 68
End: 2023-03-15

## 2023-03-15 NOTE — TELEPHONE ENCOUNTER
----- Message from Mannie Martinez DO sent at 3/14/2023 12:08 PM EDT -----  Mammogram - negative. Repeat in two years.

## 2023-07-06 DIAGNOSIS — I10 ESSENTIAL HYPERTENSION: ICD-10-CM

## 2023-07-06 DIAGNOSIS — F41.9 ANXIETY: ICD-10-CM

## 2023-07-06 RX ORDER — CITALOPRAM 20 MG/1
TABLET ORAL
Qty: 90 TABLET | Refills: 3 | Status: SHIPPED | OUTPATIENT
Start: 2023-07-06

## 2023-07-06 RX ORDER — SPIRONOLACTONE AND HYDROCHLOROTHIAZIDE 25; 25 MG/1; MG/1
1 TABLET ORAL DAILY
Qty: 90 TABLET | Refills: 3 | Status: SHIPPED | OUTPATIENT
Start: 2023-07-06 | End: 2024-07-05

## 2023-07-06 NOTE — TELEPHONE ENCOUNTER
Please Approve or Refuse.   Send to Pharmacy per Pt's Request:      Next Visit Date:  Visit date not found   Last Visit Date: 4/2/2021    Hemoglobin A1C (%)   Date Value   06/10/2022 5.7             ( goal A1C is < 7)   BP Readings from Last 3 Encounters:   01/09/23 112/62   10/10/22 108/62   08/22/22 136/76          (goal 120/80)  BUN   Date Value Ref Range Status   10/10/2022 11 8 - 23 mg/dL Final     Creatinine   Date Value Ref Range Status   10/10/2022 0.70 0.50 - 0.90 mg/dL Final     Potassium   Date Value Ref Range Status   10/10/2022 3.1 (L) 3.7 - 5.3 mmol/L Final

## 2024-01-03 DIAGNOSIS — Z78.0 MENOPAUSE: ICD-10-CM

## 2024-01-03 RX ORDER — ESTRADIOL 0.1 MG/D
FILM, EXTENDED RELEASE TRANSDERMAL
Qty: 8 PATCH | Refills: 5 | Status: SHIPPED | OUTPATIENT
Start: 2024-01-03

## 2024-01-03 NOTE — TELEPHONE ENCOUNTER
Last visit: 01/09/2023  Last Med refill: [id number]  Does patient have enough medication for 72 hours: No:     Next Visit Date:  No future appointments.    Health Maintenance   Topic Date Due    Hepatitis C screen  Never done    DTaP/Tdap/Td vaccine (1 - Tdap) Never done    Respiratory Syncytial Virus (RSV) Pregnant or age 60 yrs+ (1 - 1-dose 60+ series) Never done    Shingles vaccine (2 of 2) 03/29/2017    Pneumococcal 65+ years Vaccine (1 - PCV) Never done    A1C test (Diabetic or Prediabetic)  06/10/2023    Flu vaccine (1) 08/01/2023    COVID-19 Vaccine (4 - 2023-24 season) 09/01/2023    Annual Wellness Visit (Medicare Advantage)  01/01/2024    Depression Screen  01/09/2024    Breast cancer screen  03/14/2024    Lipids  04/02/2026    Colorectal Cancer Screen  08/24/2030    DEXA (modify frequency per FRAX score)  Completed    Hepatitis A vaccine  Aged Out    Hepatitis B vaccine  Aged Out    Hib vaccine  Aged Out    Polio vaccine  Aged Out    Meningococcal (ACWY) vaccine  Aged Out    Diabetes screen  Discontinued       Hemoglobin A1C (%)   Date Value   06/10/2022 5.7             ( goal A1C is < 7)   No components found for: \"LABMICR\"  LDL Cholesterol (mg/dL)   Date Value   04/02/2021 156 (H)   07/03/2017 159 (H)       (goal LDL is <100)   AST (U/L)   Date Value   10/09/2022 17     ALT (U/L)   Date Value   10/09/2022 10     BUN (mg/dL)   Date Value   10/10/2022 11     BP Readings from Last 3 Encounters:   01/09/23 112/62   10/10/22 108/62   08/22/22 136/76          (goal 120/80)    All Future Testing planned in [facility]  Lab Frequency Next Occurrence               Patient Active Problem List:     Hyperlipidemia     GERD (gastroesophageal reflux disease)     Allergic rhinitis     Family history of breast cancer     Family history of colon cancer     Acute appendicitis

## 2024-04-08 ENCOUNTER — TELEPHONE (OUTPATIENT)
Dept: FAMILY MEDICINE CLINIC | Age: 69
End: 2024-04-08

## 2024-04-08 ENCOUNTER — HOSPITAL ENCOUNTER (OUTPATIENT)
Dept: WOMENS IMAGING | Age: 69
Discharge: HOME | End: 2024-04-10
Payer: MEDICARE

## 2024-04-08 DIAGNOSIS — Z12.31 ENCOUNTER FOR SCREENING MAMMOGRAM FOR MALIGNANT NEOPLASM OF BREAST: ICD-10-CM

## 2024-04-08 PROCEDURE — 77063 BREAST TOMOSYNTHESIS BI: CPT

## 2024-04-08 NOTE — TELEPHONE ENCOUNTER
----- Message from Laith Banks DO sent at 4/8/2024 10:19 AM EDT -----  Mammogram - Results reviewed are normal.    Please advise patient.

## 2024-04-13 DIAGNOSIS — I10 ESSENTIAL HYPERTENSION: ICD-10-CM

## 2024-04-15 RX ORDER — SPIRONOLACTONE AND HYDROCHLOROTHIAZIDE 25; 25 MG/1; MG/1
1 TABLET ORAL DAILY
Qty: 360 TABLET | Refills: 0 | Status: SHIPPED | OUTPATIENT
Start: 2024-04-15 | End: 2025-04-15

## 2024-04-15 NOTE — TELEPHONE ENCOUNTER
Future Appointments   Date Time Provider Department Center   4/25/2024 10:30 AM Latih Banks DO [id number]

## 2024-06-07 DIAGNOSIS — F41.9 ANXIETY: ICD-10-CM

## 2024-06-10 DIAGNOSIS — Z78.0 MENOPAUSE: ICD-10-CM

## 2024-06-10 DIAGNOSIS — F41.9 ANXIETY: ICD-10-CM

## 2024-06-10 DIAGNOSIS — I10 ESSENTIAL HYPERTENSION: ICD-10-CM

## 2024-06-10 RX ORDER — ESTRADIOL 0.1 MG/D
FILM, EXTENDED RELEASE TRANSDERMAL
Qty: 8 PATCH | Refills: 11 | Status: SHIPPED | OUTPATIENT
Start: 2024-06-10 | End: 2024-06-13 | Stop reason: RX

## 2024-06-10 RX ORDER — CITALOPRAM 20 MG/1
20 TABLET ORAL EVERY MORNING
Qty: 90 TABLET | Refills: 3 | Status: SHIPPED | OUTPATIENT
Start: 2024-06-10 | End: 2025-06-10

## 2024-06-10 RX ORDER — CITALOPRAM 20 MG/1
TABLET ORAL
Qty: 90 TABLET | Refills: 3 | OUTPATIENT
Start: 2024-06-10

## 2024-06-10 RX ORDER — ESTRADIOL 0.1 MG/D
FILM, EXTENDED RELEASE TRANSDERMAL
Qty: 8 PATCH | Refills: 11 | OUTPATIENT
Start: 2024-06-10

## 2024-06-10 RX ORDER — CITALOPRAM 20 MG/1
20 TABLET ORAL EVERY MORNING
Qty: 90 TABLET | Refills: 3 | OUTPATIENT
Start: 2024-06-10 | End: 2025-06-10

## 2024-06-10 RX ORDER — SPIRONOLACTONE AND HYDROCHLOROTHIAZIDE 25; 25 MG/1; MG/1
1 TABLET ORAL DAILY
Qty: 90 TABLET | Refills: 1 | OUTPATIENT
Start: 2024-06-10 | End: 2024-12-07

## 2024-06-10 RX ORDER — SPIRONOLACTONE AND HYDROCHLOROTHIAZIDE 25; 25 MG/1; MG/1
1 TABLET ORAL DAILY
Qty: 90 TABLET | Refills: 1 | Status: SHIPPED | OUTPATIENT
Start: 2024-06-10 | End: 2024-12-07

## 2024-06-11 ENCOUNTER — TELEPHONE (OUTPATIENT)
Dept: FAMILY MEDICINE CLINIC | Age: 69
End: 2024-06-11

## 2024-06-11 DIAGNOSIS — Z78.0 MENOPAUSE: Primary | ICD-10-CM

## 2024-06-11 NOTE — TELEPHONE ENCOUNTER
Pharmacy states patient's plan does not cover ESTRADIOL 0.1 PATCH.  PHARMACY SUGGEST  FEMRING :  Estradiol vag. Cream / PREMARIN VAG CREA; ESTRING. PLEASE ADVISE AND THANK YOU

## 2024-06-14 ENCOUNTER — TELEPHONE (OUTPATIENT)
Dept: FAMILY MEDICINE CLINIC | Age: 69
End: 2024-06-14

## 2024-06-14 NOTE — TELEPHONE ENCOUNTER
Patient called about  the premarin.  Patient states she pays out of pocket for her Estradiol patches and she can not afford the premarian cream . She would like her patches sent in. Patient states she gets those at  a discount.  Please advise and thank you

## 2024-06-14 NOTE — TELEPHONE ENCOUNTER
Patient states the premarin tablets that were called in, in place of her patches, was $100 out of pocket.     She would like the Estradiol patches to be sent in and she will use [employer] and discount codes to pay for them since she is aware her insurance does not cover them at all     Please advise, thank you!

## 2024-06-18 DIAGNOSIS — Z78.0 MENOPAUSE: ICD-10-CM

## 2024-06-18 RX ORDER — ESTRADIOL 0.1 MG/D
FILM, EXTENDED RELEASE TRANSDERMAL
Qty: 8 PATCH | Refills: 11 | Status: SHIPPED | OUTPATIENT
Start: 2024-06-18

## 2024-07-14 DIAGNOSIS — Z78.0 MENOPAUSE: ICD-10-CM

## 2024-07-15 RX ORDER — ESTRADIOL 0.1 MG/D
FILM, EXTENDED RELEASE TRANSDERMAL
Qty: 8 PATCH | Refills: 11 | OUTPATIENT
Start: 2024-07-15

## 2024-07-16 RX ORDER — ESTRADIOL 0.1 MG/D
FILM, EXTENDED RELEASE TRANSDERMAL
Qty: 8 PATCH | Refills: 11 | OUTPATIENT
Start: 2024-07-16

## 2024-09-12 RX ORDER — VALACYCLOVIR HYDROCHLORIDE 500 MG/1
500 TABLET, FILM COATED ORAL DAILY
Qty: 90 TABLET | Refills: 1 | Status: SHIPPED | OUTPATIENT
Start: 2024-09-12

## 2024-11-03 ENCOUNTER — PATIENT MESSAGE (OUTPATIENT)
Dept: PRIMARY CARE CLINIC | Age: 69
End: 2024-11-03

## 2024-11-03 DIAGNOSIS — R42 VERTIGO: ICD-10-CM

## 2024-11-04 RX ORDER — MECLIZINE HYDROCHLORIDE 25 MG/1
25 TABLET ORAL 3 TIMES DAILY PRN
Qty: 15 TABLET | Refills: 1 | Status: SHIPPED | OUTPATIENT
Start: 2024-11-04 | End: 2024-11-14

## 2024-11-04 NOTE — TELEPHONE ENCOUNTER
Last OV:  1/9/2023    Next OV: Visit date not found    Pharmacy:     [id number] - [place], [place] - [address] [phone] - F [phone]  [address] [place] [address]  Phone: [phone] Fax: [phone]       Confirmed? Yes    To room via ambulation. Pt states she tripped and fell onto left knee several weeks ago and has had pain and discomfort but pain seemed to be worse today. Pt does have bruising noted left lower leg. Pt also states she has a hx of bilateral lower leg lymphedema. Pt does state she has some numbness and tingling beneath left knee cap.

## 2024-12-29 DIAGNOSIS — I10 ESSENTIAL HYPERTENSION: ICD-10-CM

## 2024-12-30 RX ORDER — SPIRONOLACTONE AND HYDROCHLOROTHIAZIDE 25; 25 MG/1; MG/1
1 TABLET ORAL DAILY
Qty: 100 TABLET | Refills: 2 | Status: SHIPPED | OUTPATIENT
Start: 2024-12-30 | End: 2025-06-28

## 2025-05-13 ENCOUNTER — HOSPITAL ENCOUNTER (OUTPATIENT)
Dept: WOMENS IMAGING | Age: 70
Discharge: HOME | End: 2025-05-15
Payer: MEDICARE

## 2025-05-13 VITALS — WEIGHT: 178 LBS | BODY MASS INDEX: 33.61 KG/M2 | HEIGHT: 61 IN

## 2025-05-13 DIAGNOSIS — Z12.31 VISIT FOR SCREENING MAMMOGRAM: ICD-10-CM

## 2025-05-13 PROCEDURE — 77063 BREAST TOMOSYNTHESIS BI: CPT

## 2025-05-15 ENCOUNTER — RESULTS FOLLOW-UP (OUTPATIENT)
Dept: PRIMARY CARE CLINIC | Age: 70
End: 2025-05-15

## 2025-06-26 ENCOUNTER — OFFICE VISIT (OUTPATIENT)
Dept: FAMILY MEDICINE CLINIC | Age: 70
End: 2025-06-26

## 2025-06-26 VITALS
WEIGHT: 176.6 LBS | DIASTOLIC BLOOD PRESSURE: 82 MMHG | HEART RATE: 98 BPM | OXYGEN SATURATION: 95 % | SYSTOLIC BLOOD PRESSURE: 142 MMHG | BODY MASS INDEX: 33.37 KG/M2 | RESPIRATION RATE: 16 BRPM | TEMPERATURE: 97 F

## 2025-06-26 DIAGNOSIS — R42 VERTIGO: Primary | ICD-10-CM

## 2025-06-26 RX ORDER — MECLIZINE HCL 12.5 MG 12.5 MG/1
12.5 TABLET ORAL 3 TIMES DAILY PRN
Qty: 15 TABLET | Refills: 0 | Status: SHIPPED | OUTPATIENT
Start: 2025-06-26 | End: 2025-07-06

## 2025-06-26 SDOH — ECONOMIC STABILITY: FOOD INSECURITY: WITHIN THE PAST 12 MONTHS, YOU WORRIED THAT YOUR FOOD WOULD RUN OUT BEFORE YOU GOT MONEY TO BUY MORE.: NEVER TRUE

## 2025-06-26 SDOH — ECONOMIC STABILITY: FOOD INSECURITY: WITHIN THE PAST 12 MONTHS, THE FOOD YOU BOUGHT JUST DIDN'T LAST AND YOU DIDN'T HAVE MONEY TO GET MORE.: NEVER TRUE

## 2025-06-26 ASSESSMENT — ENCOUNTER SYMPTOMS
DIARRHEA: 0
VISUAL CHANGE: 0
RESPIRATORY NEGATIVE: 1
VOMITING: 0
SHORTNESS OF BREATH: 0
BLOOD IN STOOL: 0
BACK PAIN: 0
NAUSEA: 0

## 2025-06-26 ASSESSMENT — PATIENT HEALTH QUESTIONNAIRE - PHQ9
2. FEELING DOWN, DEPRESSED OR HOPELESS: NOT AT ALL
SUM OF ALL RESPONSES TO PHQ QUESTIONS 1-9: 0
1. LITTLE INTEREST OR PLEASURE IN DOING THINGS: NOT AT ALL
SUM OF ALL RESPONSES TO PHQ QUESTIONS 1-9: 0

## 2025-06-26 NOTE — PATIENT INSTRUCTIONS
Increase water intake  Drink 8, 8oz glasses water daily  Follow up with your chiropractor   Noted.  Uric acid level ordered to be done with his next set of labs ordered by me for April.

## 2025-06-26 NOTE — PROGRESS NOTES
[facility]  [address]  [address]  [place] [address]  Dept: [phone]  Dept Fax: [phone]    Jonelle Segundo is a 70 y.o. female who presents to the urgent care today for her medical conditions/complaints as notedbelow.  Jonelle Segundo is c/o of Dizziness (Dizziness for the past x4-5 days. Intermittent. Dizziness when going from sitting to standing. Lifting head up or down. )      HPI:     70 yr old female presents for intermittent motion dependent dizziness 4-5 days. Occurs when moves head up and down or sits/stands up. Does not occur every time.   Pressing on her forehead makes dizziness stop  Room spins at times  Denies sob, chest pain, ear pain, syncope or near syncope, no n/v, no headaches or change in vision, no neck pain  Denies allergies, stuffy or runny nose, ear pain.   No dizziness if lying down or after standing for a minute or 2.  Has had issues with vertigo and dizziness in the past.  Has also seen chiropractor for dizziness.  They adjusted her neck and head and symptoms resolved after a few visits.  She plans to follow-up with them  It has been also very hot outside and she has not been drinking much water but states she is in the air conditioning most of the time.  She is retired.    Dizziness  Quality:  Vertigo  Severity:  Mild  Onset quality:  Sudden  Timing:  Intermittent  Progression:  Waxing and waning  Chronicity:  Recurrent  Context: head movement and standing up    Context: not when bending over, not with bowel movement, not with ear pain, not with eye movement, not with inactivity, not with loss of consciousness, not with medication, not with physical activity and not when urinating    Relieved by:  Being still and lying down  Exacerbated by: looking up or down, sitting or standing up.  Ineffective treatments:  Lying down and change in position  Associated symptoms: no blood in stool, no chest

## (undated) DEVICE — GLOVE ORTHO 7   MSG9470

## (undated) DEVICE — Device

## (undated) DEVICE — MASTISOL ADHESIVE AMPULE

## (undated) DEVICE — SPONGE GZ W4XL4IN RAYON POLY FILL CVR W/ NONWOVEN FAB

## (undated) DEVICE — SEAL

## (undated) DEVICE — BLADELESS OBTURATOR: Brand: WECK VISTA

## (undated) DEVICE — 4-PORT MANIFOLD: Brand: NEPTUNE 2

## (undated) DEVICE — TROCAR: Brand: KII FIOS FIRST ENTRY

## (undated) DEVICE — SHEET,DRAPE,53X77,STERILE: Brand: MEDLINE

## (undated) DEVICE — FLUFF UNDERPAD,MODERATE: Brand: WINGS

## (undated) DEVICE — SUTURE MCRYL + SZ 4-0 L18IN ABSRB UD L19MM PS-2 3/8 CIR MCP496G

## (undated) DEVICE — SYRINGE MED 50ML LUERLOCK TIP

## (undated) DEVICE — BASIN EMSIS 700ML GRAPHITE PLAS KID SHP GRAD

## (undated) DEVICE — TUBING, SUCTION, 3/16" X 10', STRAIGHT: Brand: MEDLINE

## (undated) DEVICE — CONNECTOR TBNG AUX H2O JET DISP FOR OLY 160/180 SER

## (undated) DEVICE — PLUMEPORT LAPAROSCOPIC SMOKE FILTRATION DEVICE: Brand: PLUMEPORT ACTIV

## (undated) DEVICE — MHPB BASIC LAP PACK: Brand: MEDLINE INDUSTRIES, INC.

## (undated) DEVICE — SOLUTION IV IRRIG POUR BRL 0.9% SODIUM CHL 2F7124

## (undated) DEVICE — ADAPTER,CATHETER/SYRINGE/LUER,STERILE: Brand: MEDLINE

## (undated) DEVICE — ARM DRAPE

## (undated) DEVICE — SUTURE VCRL + SZ 3-0 L27IN ABSRB UD L26MM SH 1/2 CIR VCP416H

## (undated) DEVICE — Device: Brand: DEFENDO VALVE AND CONNECTOR KIT

## (undated) DEVICE — BLANKET WRM W40.2XL55.9IN IORT LO BODY + MISTRAL AIR

## (undated) DEVICE — PAD PT POS 36 IN SURGYPAD DISP

## (undated) DEVICE — STAPLER 60: Brand: SUREFORM

## (undated) DEVICE — VESSEL SEALER EXTEND: Brand: ENDOWRIST

## (undated) DEVICE — GOWN,AURORA,NONRNF,XL,30/CS: Brand: MEDLINE

## (undated) DEVICE — STRIP,CLOSURE,WOUND,MEDI-STRIP,1/2X4: Brand: MEDLINE

## (undated) DEVICE — BAG SPEC REM 224ML W4XL6IN DIA10MM 1 HND GYN DISP ENDOPCH

## (undated) DEVICE — REDUCER: Brand: ENDOWRIST

## (undated) DEVICE — CANNULA SEAL

## (undated) DEVICE — PENCIL ES L3M BTTN SWCH HOLSTER W/ BLDE ELECTRD EDGE

## (undated) DEVICE — JELLY,LUBE,STERILE,FLIP TOP,TUBE,2-OZ: Brand: MEDLINE

## (undated) DEVICE — STAPLER 60 RELOAD WHITE: Brand: SUREFORM

## (undated) DEVICE — PROTECTOR ULN NRV PUR FOAM HK LOOP STRP ANATOMICALLY